# Patient Record
Sex: MALE | Race: WHITE | NOT HISPANIC OR LATINO | Employment: OTHER | URBAN - METROPOLITAN AREA
[De-identification: names, ages, dates, MRNs, and addresses within clinical notes are randomized per-mention and may not be internally consistent; named-entity substitution may affect disease eponyms.]

---

## 2021-05-28 ENCOUNTER — PRE-ADMISSION TESTING (OUTPATIENT)
Dept: PREADMISSION TESTING | Facility: HOSPITAL | Age: 86
End: 2021-05-28

## 2021-05-28 ENCOUNTER — TRANSCRIBE ORDERS (OUTPATIENT)
Dept: PREADMISSION TESTING | Facility: HOSPITAL | Age: 86
End: 2021-05-28

## 2021-05-28 VITALS
HEART RATE: 99 BPM | RESPIRATION RATE: 16 BRPM | HEIGHT: 76 IN | TEMPERATURE: 97.4 F | DIASTOLIC BLOOD PRESSURE: 56 MMHG | SYSTOLIC BLOOD PRESSURE: 117 MMHG | WEIGHT: 217 LBS | BODY MASS INDEX: 26.42 KG/M2 | OXYGEN SATURATION: 100 %

## 2021-05-28 DIAGNOSIS — Z01.818 OTHER SPECIFIED PRE-OPERATIVE EXAMINATION: Primary | ICD-10-CM

## 2021-05-28 LAB
ANION GAP SERPL CALCULATED.3IONS-SCNC: 14.2 MMOL/L (ref 5–15)
BUN SERPL-MCNC: 78 MG/DL (ref 8–23)
BUN/CREAT SERPL: 11.5 (ref 7–25)
CALCIUM SPEC-SCNC: 8.1 MG/DL (ref 8.6–10.5)
CHLORIDE SERPL-SCNC: 98 MMOL/L (ref 98–107)
CO2 SERPL-SCNC: 24.8 MMOL/L (ref 22–29)
CREAT SERPL-MCNC: 6.79 MG/DL (ref 0.76–1.27)
DEPRECATED RDW RBC AUTO: 58.5 FL (ref 37–54)
ERYTHROCYTE [DISTWIDTH] IN BLOOD BY AUTOMATED COUNT: 15.4 % (ref 12.3–15.4)
GFR SERPL CREATININE-BSD FRML MDRD: 8 ML/MIN/1.73
GFR SERPL CREATININE-BSD FRML MDRD: ABNORMAL ML/MIN/{1.73_M2}
GLUCOSE SERPL-MCNC: 290 MG/DL (ref 65–99)
HCT VFR BLD AUTO: 20.8 % (ref 37.5–51)
HGB BLD-MCNC: 6.5 G/DL (ref 13–17.7)
MCH RBC QN AUTO: 33 PG (ref 26.6–33)
MCHC RBC AUTO-ENTMCNC: 31.3 G/DL (ref 31.5–35.7)
MCV RBC AUTO: 105.6 FL (ref 79–97)
PLATELET # BLD AUTO: 146 10*3/MM3 (ref 140–450)
PMV BLD AUTO: 10.4 FL (ref 6–12)
POTASSIUM SERPL-SCNC: 4.7 MMOL/L (ref 3.5–5.2)
QT INTERVAL: 412 MS
RBC # BLD AUTO: 1.97 10*6/MM3 (ref 4.14–5.8)
SODIUM SERPL-SCNC: 137 MMOL/L (ref 136–145)
WBC # BLD AUTO: 5.75 10*3/MM3 (ref 3.4–10.8)

## 2021-05-28 PROCEDURE — 80048 BASIC METABOLIC PNL TOTAL CA: CPT

## 2021-05-28 PROCEDURE — 36415 COLL VENOUS BLD VENIPUNCTURE: CPT

## 2021-05-28 PROCEDURE — 93010 ELECTROCARDIOGRAM REPORT: CPT | Performed by: INTERNAL MEDICINE

## 2021-05-28 PROCEDURE — 85027 COMPLETE CBC AUTOMATED: CPT

## 2021-05-28 PROCEDURE — 93005 ELECTROCARDIOGRAM TRACING: CPT

## 2021-05-28 RX ORDER — AMLODIPINE BESYLATE 2.5 MG/1
2.5 TABLET ORAL EVERY EVENING
COMMUNITY
End: 2021-07-30

## 2021-05-28 NOTE — DISCHARGE INSTRUCTIONS
Take the following medications the morning of surgery with a small sip of water:    LEVOTHYROXINE AND SYMBICORT    ARRIVE AT 10:00    If you are on prescription narcotic pain medication to control your pain you may also take that medication the morning of surgery.    General Instructions:  • Do not eat or drink anything after midnight the night before surgery.  • Infants may have breast milk up to four hours before surgery.  • Infants drinking formula may drink formula up to six hours before surgery.   • Patients who avoid smoking, chewing tobacco and alcohol for 4 weeks prior to surgery have a reduced risk of post-operative complications.  Quit smoking as many days before surgery as you can.  • Do not smoke, use chewing tobacco or drink alcohol the day of surgery.   • If applicable bring your C-PAP/ BI-PAP machine.  • Bring any papers given to you in the doctor’s office.  • Wear clean comfortable clothes.  • Do not wear contact lenses, false eyelashes or make-up.  Bring a case for your glasses.   • Bring crutches or walker if applicable.  • Remove all piercings.  Leave jewelry and any other valuables at home.  • Hair extensions with metal clips must be removed prior to surgery.  • The Pre-Admission Testing nurse will instruct you to bring medications if unable to obtain an accurate list in Pre-Admission Testing.        If you were given a blood bank ID arm band remember to bring it with you the day of surgery.    Preventing a Surgical Site Infection:  • For 2 to 3 days before surgery, avoid shaving with a razor because the razor can irritate skin and make it easier to develop an infection.    • Any areas of open skin can increase the risk of a post-operative wound infection by allowing bacteria to enter and travel throughout the body.  Notify your surgeon if you have any skin wounds / rashes even if it is not near the expected surgical site.  The area will need assessed to determine if surgery should be delayed  until it is healed.  • The night prior to surgery shower using a fresh bar of anti-bacterial soap (such as Dial) and clean washcloth.  Sleep in a clean bed with clean clothing.  Do not allow pets to sleep with you.  • Shower on the morning of surgery using a fresh bar of anti-bacterial soap (such as Dial) and clean washcloth.  Dry with a clean towel and dress in clean clothing.  • Ask your surgeon if you will be receiving antibiotics prior to surgery.  • Make sure you, your family, and all healthcare providers clean their hands with soap and water or an alcohol based hand  before caring for you or your wound.    Day of surgery:  Your arrival time is approximately two hours before your scheduled surgery time.  Upon arrival, a Pre-op nurse and Anesthesiologist will review your health history, obtain vital signs, and answer questions you may have.  The only belongings needed at this time will be your home medications and if applicable your C-PAP/BI-PAP machine.  A Pre-op nurse will start an IV and you may receive medication in preparation for surgery, including something to help you relax.      Please be aware that surgery does come with discomfort.  We want to make every effort to control your discomfort so please discuss any uncontrolled symptoms with your nurse.   Your doctor will most likely have prescribed pain medications.      If you are going home after surgery you will receive individualized written care instructions before being discharged.  A responsible adult must drive you to and from the hospital on the day of your surgery and stay with you for 24 hours.  Discharge prescriptions can be filled by the hospital pharmacy during regular pharmacy hours.  If you are having surgery late in the day/evening your prescription may be e-prescribed to your pharmacy.  Please verify your pharmacy hours or chose a 24 hour pharmacy to avoid not having access to your prescription because your pharmacy has closed  for the day.    If you are staying overnight following surgery, you will be transported to your hospital room following the recovery period.  [facility] has all private rooms.    If you have any questions please call Pre-Admission Testing at [phone].  Deductibles and co-payments are collected on the day of service. Please be prepared to pay the required co-pay, deductible or deposit on the day of service as defined by your plan.    Patient Education for Self-Quarantine Process    Following your COVID testing, we strongly recommend that you do not leave your home after you have been tested for COVID except to get medical care. This includes not going to work, school or to public areas.  If this is not possible for you to do please limit your activities to only required outings.  Be sure to wear a mask when you are with other people, practice social distancing and wash your hands frequently.      The following items provide additional details to keep you safe.  • Wash your hands with soap and water frequently for at least 20 seconds.   • Avoid touching your eyes, nose and mouth with unwashed hands.  • Do not share anything - utensils, towels, food from the same bowl.   • Have your own utensils, drinking glass, dishes, towels and bedding.   • Do not have visitors.   • Do use FaceTime to stay in touch with family and friends.  • You should stay in a specific room away from others if possible.   • Stay at least 6 feet away from others in the home if you cannot have a dedicated room to yourself.   • Do not snuggle with your pet. While the CDC says there is no evidence that pets can spread COVID-19 or be infected from humans, it is probably best to avoid “petting, snuggling, being kissed or licked and sharing food (during self-quarantine)”, according to the CDC.   • Sanitize household surfaces daily. Include all high touch areas (door handles, light switches, phones, countertops, etc.)  • Do not share  a bathroom with others, if possible.   • Wear a mask around others in your home if you are unable to stay in a separate room or 6 feet apart. If  you are unable to wear a mask, have your family member wear a mask if they must be within 6 feet of you.   Call your surgeon immediately if you experience any of the following symptoms:  • Sore Throat  • Shortness of Breath or difficulty breathing  • Cough  • Chills  • Body soreness or muscle pain  • Headache  • Fever  • New loss of taste or smell  • Do not arrive for your surgery ill.  Your procedure will need to be rescheduled to another time.  You will need to call your physician before the day of surgery to avoid any unnecessary exposure to hospital staff as well as other patients.

## 2021-05-29 ENCOUNTER — LAB (OUTPATIENT)
Dept: LAB | Facility: HOSPITAL | Age: 86
End: 2021-05-29

## 2021-05-29 DIAGNOSIS — Z01.818 OTHER SPECIFIED PRE-OPERATIVE EXAMINATION: ICD-10-CM

## 2021-05-29 LAB — SARS-COV-2 ORF1AB RESP QL NAA+PROBE: NOT DETECTED

## 2021-05-29 PROCEDURE — C9803 HOPD COVID-19 SPEC COLLECT: HCPCS

## 2021-05-29 PROCEDURE — U0004 COV-19 TEST NON-CDC HGH THRU: HCPCS

## 2021-05-29 PROCEDURE — U0005 INFEC AGEN DETEC AMPLI PROBE: HCPCS

## 2021-06-01 ENCOUNTER — APPOINTMENT (OUTPATIENT)
Dept: CARDIOLOGY | Facility: HOSPITAL | Age: 86
End: 2021-06-01

## 2021-06-01 ENCOUNTER — HOSPITAL ENCOUNTER (OUTPATIENT)
Facility: HOSPITAL | Age: 86
Setting detail: HOSPITAL OUTPATIENT SURGERY
Discharge: HOME | End: 2021-06-01
Attending: UROLOGY | Admitting: UROLOGY

## 2021-06-01 ENCOUNTER — ANESTHESIA (OUTPATIENT)
Dept: PERIOP | Facility: HOSPITAL | Age: 86
End: 2021-06-01

## 2021-06-01 ENCOUNTER — ANESTHESIA EVENT (OUTPATIENT)
Dept: PERIOP | Facility: HOSPITAL | Age: 86
End: 2021-06-01

## 2021-06-01 VITALS
TEMPERATURE: 97.4 F | DIASTOLIC BLOOD PRESSURE: 82 MMHG | HEART RATE: 86 BPM | SYSTOLIC BLOOD PRESSURE: 136 MMHG | OXYGEN SATURATION: 98 % | BODY MASS INDEX: 26.91 KG/M2 | WEIGHT: 216.4 LBS | HEIGHT: 75 IN | RESPIRATION RATE: 20 BRPM

## 2021-06-01 DIAGNOSIS — R31.0 GROSS HEMATURIA: Primary | ICD-10-CM

## 2021-06-01 LAB
ABO GROUP BLD: NORMAL
ANION GAP SERPL CALCULATED.3IONS-SCNC: 14.2 MMOL/L (ref 5–15)
BLD GP AB SCN SERPL QL: NEGATIVE
BUN SERPL-MCNC: 89 MG/DL (ref 8–23)
BUN/CREAT SERPL: 13.9 (ref 7–25)
CALCIUM SPEC-SCNC: 8.8 MG/DL (ref 8.6–10.5)
CHLORIDE SERPL-SCNC: 100 MMOL/L (ref 98–107)
CO2 SERPL-SCNC: 28.8 MMOL/L (ref 22–29)
CREAT SERPL-MCNC: 6.41 MG/DL (ref 0.76–1.27)
GFR SERPL CREATININE-BSD FRML MDRD: 8 ML/MIN/1.73
GFR SERPL CREATININE-BSD FRML MDRD: ABNORMAL ML/MIN/{1.73_M2}
GLUCOSE BLDC GLUCOMTR-MCNC: 209 MG/DL (ref 70–130)
GLUCOSE BLDC GLUCOMTR-MCNC: 222 MG/DL (ref 70–130)
GLUCOSE SERPL-MCNC: 212 MG/DL (ref 65–99)
HCT VFR BLD AUTO: 25.9 % (ref 37.5–51)
HGB BLD-MCNC: 8.2 G/DL (ref 13–17.7)
POTASSIUM SERPL-SCNC: 4.5 MMOL/L (ref 3.5–5.2)
QT INTERVAL: 382 MS
RH BLD: NEGATIVE
SODIUM SERPL-SCNC: 143 MMOL/L (ref 136–145)
T&S EXPIRATION DATE: NORMAL

## 2021-06-01 PROCEDURE — 99204 OFFICE O/P NEW MOD 45 MIN: CPT | Performed by: INTERNAL MEDICINE

## 2021-06-01 PROCEDURE — 93225 XTRNL ECG REC<48 HRS REC: CPT

## 2021-06-01 PROCEDURE — 80048 BASIC METABOLIC PNL TOTAL CA: CPT | Performed by: UROLOGY

## 2021-06-01 PROCEDURE — 25010000002 ATROPINE PER 0.01 MG: Performed by: NURSE ANESTHETIST, CERTIFIED REGISTERED

## 2021-06-01 PROCEDURE — 63710000001 INSULIN REGULAR HUMAN PER 5 UNITS: Performed by: ANESTHESIOLOGY

## 2021-06-01 PROCEDURE — 25010000002 CEFAZOLIN 1-4 GM/50ML-% SOLUTION: Performed by: UROLOGY

## 2021-06-01 PROCEDURE — 85018 HEMOGLOBIN: CPT | Performed by: UROLOGY

## 2021-06-01 PROCEDURE — 86900 BLOOD TYPING SEROLOGIC ABO: CPT | Performed by: UROLOGY

## 2021-06-01 PROCEDURE — 93226 XTRNL ECG REC<48 HR SCAN A/R: CPT

## 2021-06-01 PROCEDURE — 25010000002 FENTANYL CITRATE (PF) 50 MCG/ML SOLUTION: Performed by: NURSE ANESTHETIST, CERTIFIED REGISTERED

## 2021-06-01 PROCEDURE — 85014 HEMATOCRIT: CPT | Performed by: UROLOGY

## 2021-06-01 PROCEDURE — 82962 GLUCOSE BLOOD TEST: CPT

## 2021-06-01 PROCEDURE — 86850 RBC ANTIBODY SCREEN: CPT | Performed by: UROLOGY

## 2021-06-01 PROCEDURE — 86901 BLOOD TYPING SEROLOGIC RH(D): CPT | Performed by: UROLOGY

## 2021-06-01 PROCEDURE — 25010000002 HYDROMORPHONE PER 4 MG: Performed by: NURSE ANESTHETIST, CERTIFIED REGISTERED

## 2021-06-01 PROCEDURE — 93005 ELECTROCARDIOGRAM TRACING: CPT | Performed by: INTERNAL MEDICINE

## 2021-06-01 PROCEDURE — 25010000002 PROPOFOL 10 MG/ML EMULSION: Performed by: NURSE ANESTHETIST, CERTIFIED REGISTERED

## 2021-06-01 PROCEDURE — 93010 ELECTROCARDIOGRAM REPORT: CPT | Performed by: INTERNAL MEDICINE

## 2021-06-01 RX ORDER — HYDROCODONE BITARTRATE AND ACETAMINOPHEN 7.5; 325 MG/1; MG/1
1 TABLET ORAL ONCE AS NEEDED
Status: DISCONTINUED | OUTPATIENT
Start: 2021-06-01 | End: 2021-06-01 | Stop reason: HOSPADM

## 2021-06-01 RX ORDER — DIPHENOXYLATE HYDROCHLORIDE AND ATROPINE SULFATE 2.5; .025 MG/1; MG/1
1 TABLET ORAL DAILY
COMMUNITY

## 2021-06-01 RX ORDER — LORATADINE 10 MG/1
10 TABLET ORAL DAILY
COMMUNITY
Start: 2021-01-19

## 2021-06-01 RX ORDER — ZOLPIDEM TARTRATE 5 MG/1
5 TABLET ORAL
COMMUNITY
End: 2021-08-02

## 2021-06-01 RX ORDER — LIDOCAINE HYDROCHLORIDE 10 MG/ML
0.5 INJECTION, SOLUTION EPIDURAL; INFILTRATION; INTRACAUDAL; PERINEURAL ONCE AS NEEDED
Status: DISCONTINUED | OUTPATIENT
Start: 2021-06-01 | End: 2021-06-01 | Stop reason: HOSPADM

## 2021-06-01 RX ORDER — LIDOCAINE HYDROCHLORIDE 20 MG/ML
INJECTION, SOLUTION INFILTRATION; PERINEURAL AS NEEDED
Status: DISCONTINUED | OUTPATIENT
Start: 2021-06-01 | End: 2021-06-01 | Stop reason: SURG

## 2021-06-01 RX ORDER — PROMETHAZINE HYDROCHLORIDE 25 MG/1
25 SUPPOSITORY RECTAL ONCE AS NEEDED
Status: DISCONTINUED | OUTPATIENT
Start: 2021-06-01 | End: 2021-06-01 | Stop reason: HOSPADM

## 2021-06-01 RX ORDER — OXYCODONE AND ACETAMINOPHEN 10; 325 MG/1; MG/1
1 TABLET ORAL EVERY 4 HOURS PRN
Status: DISCONTINUED | OUTPATIENT
Start: 2021-06-01 | End: 2021-06-01 | Stop reason: HOSPADM

## 2021-06-01 RX ORDER — SODIUM CHLORIDE, SODIUM LACTATE, POTASSIUM CHLORIDE, CALCIUM CHLORIDE 600; 310; 30; 20 MG/100ML; MG/100ML; MG/100ML; MG/100ML
INJECTION, SOLUTION INTRAVENOUS CONTINUOUS PRN
Status: DISCONTINUED | OUTPATIENT
Start: 2021-06-01 | End: 2021-06-01 | Stop reason: SURG

## 2021-06-01 RX ORDER — ERGOCALCIFEROL 1.25 MG/1
50000 CAPSULE ORAL WEEKLY
COMMUNITY

## 2021-06-01 RX ORDER — ATROPINE SULFATE 0.4 MG/ML
AMPUL (ML) INJECTION AS NEEDED
Status: DISCONTINUED | OUTPATIENT
Start: 2021-06-01 | End: 2021-06-01 | Stop reason: SURG

## 2021-06-01 RX ORDER — BLOOD SUGAR DIAGNOSTIC
STRIP MISCELLANEOUS
COMMUNITY
Start: 2021-04-16 | End: 2022-04-16

## 2021-06-01 RX ORDER — HYDROMORPHONE HYDROCHLORIDE 1 MG/ML
0.25 INJECTION, SOLUTION INTRAMUSCULAR; INTRAVENOUS; SUBCUTANEOUS
Status: DISCONTINUED | OUTPATIENT
Start: 2021-06-01 | End: 2021-06-01 | Stop reason: HOSPADM

## 2021-06-01 RX ORDER — SULFAMETHOXAZOLE AND TRIMETHOPRIM 800; 160 MG/1; MG/1
1 TABLET ORAL 2 TIMES DAILY
Qty: 6 TABLET | Refills: 0 | Status: SHIPPED | OUTPATIENT
Start: 2021-06-01 | End: 2021-06-29

## 2021-06-01 RX ORDER — PROMETHAZINE HYDROCHLORIDE 25 MG/1
25 TABLET ORAL ONCE AS NEEDED
Status: DISCONTINUED | OUTPATIENT
Start: 2021-06-01 | End: 2021-06-01 | Stop reason: HOSPADM

## 2021-06-01 RX ORDER — FENTANYL CITRATE 50 UG/ML
INJECTION, SOLUTION INTRAMUSCULAR; INTRAVENOUS AS NEEDED
Status: DISCONTINUED | OUTPATIENT
Start: 2021-06-01 | End: 2021-06-01 | Stop reason: SURG

## 2021-06-01 RX ORDER — ATORVASTATIN CALCIUM 40 MG/1
40 TABLET, FILM COATED ORAL NIGHTLY
COMMUNITY
Start: 2021-03-15

## 2021-06-01 RX ORDER — FENTANYL CITRATE 50 UG/ML
50 INJECTION, SOLUTION INTRAMUSCULAR; INTRAVENOUS
Status: DISCONTINUED | OUTPATIENT
Start: 2021-06-01 | End: 2021-06-01 | Stop reason: HOSPADM

## 2021-06-01 RX ORDER — PROPOFOL 10 MG/ML
VIAL (ML) INTRAVENOUS AS NEEDED
Status: DISCONTINUED | OUTPATIENT
Start: 2021-06-01 | End: 2021-06-01 | Stop reason: SURG

## 2021-06-01 RX ORDER — LABETALOL HYDROCHLORIDE 5 MG/ML
5 INJECTION, SOLUTION INTRAVENOUS
Status: DISCONTINUED | OUTPATIENT
Start: 2021-06-01 | End: 2021-06-01 | Stop reason: HOSPADM

## 2021-06-01 RX ORDER — SODIUM CHLORIDE 9 MG/ML
9 INJECTION, SOLUTION INTRAVENOUS CONTINUOUS
Status: DISCONTINUED | OUTPATIENT
Start: 2021-06-01 | End: 2021-06-01 | Stop reason: HOSPADM

## 2021-06-01 RX ORDER — MAGNESIUM HYDROXIDE 1200 MG/15ML
LIQUID ORAL AS NEEDED
Status: DISCONTINUED | OUTPATIENT
Start: 2021-06-01 | End: 2021-06-01 | Stop reason: HOSPADM

## 2021-06-01 RX ORDER — DIPHENHYDRAMINE HCL 25 MG
25 CAPSULE ORAL
Status: DISCONTINUED | OUTPATIENT
Start: 2021-06-01 | End: 2021-06-01 | Stop reason: HOSPADM

## 2021-06-01 RX ORDER — CEFAZOLIN SODIUM 1 G/50ML
1 INJECTION, SOLUTION INTRAVENOUS ONCE
Status: COMPLETED | OUTPATIENT
Start: 2021-06-01 | End: 2021-06-01

## 2021-06-01 RX ORDER — TRAZODONE HYDROCHLORIDE 50 MG/1
50-100 TABLET ORAL NIGHTLY
COMMUNITY
Start: 2021-05-12

## 2021-06-01 RX ORDER — EPHEDRINE SULFATE 50 MG/ML
5 INJECTION, SOLUTION INTRAVENOUS ONCE AS NEEDED
Status: DISCONTINUED | OUTPATIENT
Start: 2021-06-01 | End: 2021-06-01 | Stop reason: HOSPADM

## 2021-06-01 RX ORDER — ONDANSETRON 2 MG/ML
4 INJECTION INTRAMUSCULAR; INTRAVENOUS ONCE AS NEEDED
Status: DISCONTINUED | OUTPATIENT
Start: 2021-06-01 | End: 2021-06-01 | Stop reason: HOSPADM

## 2021-06-01 RX ORDER — NALOXONE HCL 0.4 MG/ML
0.2 VIAL (ML) INJECTION AS NEEDED
Status: DISCONTINUED | OUTPATIENT
Start: 2021-06-01 | End: 2021-06-01 | Stop reason: HOSPADM

## 2021-06-01 RX ORDER — ACETAMINOPHEN 325 MG/1
650 TABLET ORAL ONCE AS NEEDED
Status: DISCONTINUED | OUTPATIENT
Start: 2021-06-01 | End: 2021-06-01 | Stop reason: HOSPADM

## 2021-06-01 RX ORDER — LANCETS
EACH MISCELLANEOUS
COMMUNITY
Start: 2021-04-16 | End: 2022-04-16

## 2021-06-01 RX ORDER — LANOLIN ALCOHOL/MO/W.PET/CERES
1 CREAM (GRAM) TOPICAL DAILY
COMMUNITY
End: 2021-08-02

## 2021-06-01 RX ORDER — BLOOD-GLUCOSE METER
EACH MISCELLANEOUS
COMMUNITY
Start: 2021-04-16

## 2021-06-01 RX ORDER — FAMOTIDINE 10 MG/ML
20 INJECTION, SOLUTION INTRAVENOUS ONCE
Status: COMPLETED | OUTPATIENT
Start: 2021-06-01 | End: 2021-06-01

## 2021-06-01 RX ORDER — LIDOCAINE HYDROCHLORIDE 20 MG/ML
JELLY TOPICAL ONCE AS NEEDED
Status: COMPLETED | OUTPATIENT
Start: 2021-06-01 | End: 2021-06-01

## 2021-06-01 RX ORDER — DIPHENHYDRAMINE HYDROCHLORIDE 50 MG/ML
12.5 INJECTION INTRAMUSCULAR; INTRAVENOUS
Status: DISCONTINUED | OUTPATIENT
Start: 2021-06-01 | End: 2021-06-01 | Stop reason: HOSPADM

## 2021-06-01 RX ORDER — FLUMAZENIL 0.1 MG/ML
0.2 INJECTION INTRAVENOUS AS NEEDED
Status: DISCONTINUED | OUTPATIENT
Start: 2021-06-01 | End: 2021-06-01 | Stop reason: HOSPADM

## 2021-06-01 RX ORDER — SODIUM CHLORIDE 0.9 % (FLUSH) 0.9 %
3-10 SYRINGE (ML) INJECTION AS NEEDED
Status: DISCONTINUED | OUTPATIENT
Start: 2021-06-01 | End: 2021-06-01 | Stop reason: HOSPADM

## 2021-06-01 RX ORDER — SODIUM CHLORIDE 0.9 % (FLUSH) 0.9 %
3 SYRINGE (ML) INJECTION EVERY 12 HOURS SCHEDULED
Status: DISCONTINUED | OUTPATIENT
Start: 2021-06-01 | End: 2021-06-01 | Stop reason: HOSPADM

## 2021-06-01 RX ORDER — GLYCOPYRROLATE 0.2 MG/ML
INJECTION INTRAMUSCULAR; INTRAVENOUS AS NEEDED
Status: DISCONTINUED | OUTPATIENT
Start: 2021-06-01 | End: 2021-06-01 | Stop reason: SURG

## 2021-06-01 RX ORDER — SODIUM CHLORIDE, SODIUM LACTATE, POTASSIUM CHLORIDE, CALCIUM CHLORIDE 600; 310; 30; 20 MG/100ML; MG/100ML; MG/100ML; MG/100ML
9 INJECTION, SOLUTION INTRAVENOUS CONTINUOUS
Status: DISCONTINUED | OUTPATIENT
Start: 2021-06-01 | End: 2021-06-01

## 2021-06-01 RX ORDER — MIDAZOLAM HYDROCHLORIDE 1 MG/ML
0.5 INJECTION INTRAMUSCULAR; INTRAVENOUS
Status: DISCONTINUED | OUTPATIENT
Start: 2021-06-01 | End: 2021-06-01 | Stop reason: HOSPADM

## 2021-06-01 RX ORDER — FENTANYL CITRATE 50 UG/ML
25 INJECTION, SOLUTION INTRAMUSCULAR; INTRAVENOUS
Status: DISCONTINUED | OUTPATIENT
Start: 2021-06-01 | End: 2021-06-01 | Stop reason: HOSPADM

## 2021-06-01 RX ADMIN — SODIUM CHLORIDE, POTASSIUM CHLORIDE, SODIUM LACTATE AND CALCIUM CHLORIDE: 600; 310; 30; 20 INJECTION, SOLUTION INTRAVENOUS at 12:19

## 2021-06-01 RX ADMIN — GLYCOPYRROLATE 0.2 MG: 0.2 INJECTION INTRAMUSCULAR; INTRAVENOUS at 12:30

## 2021-06-01 RX ADMIN — PROPOFOL 100 MG: 10 INJECTION, EMULSION INTRAVENOUS at 12:24

## 2021-06-01 RX ADMIN — FENTANYL CITRATE 25 MCG: 50 INJECTION, SOLUTION INTRAMUSCULAR; INTRAVENOUS at 13:00

## 2021-06-01 RX ADMIN — LIDOCAINE HYDROCHLORIDE 60 MG: 20 INJECTION, SOLUTION INFILTRATION; PERINEURAL at 12:24

## 2021-06-01 RX ADMIN — HYDROMORPHONE HYDROCHLORIDE 0.25 MG: 1 INJECTION, SOLUTION INTRAMUSCULAR; INTRAVENOUS; SUBCUTANEOUS at 13:25

## 2021-06-01 RX ADMIN — INSULIN HUMAN 5 UNITS: 100 INJECTION, SOLUTION PARENTERAL at 12:00

## 2021-06-01 RX ADMIN — CEFAZOLIN SODIUM 1 G: 1 INJECTION, SOLUTION INTRAVENOUS at 12:16

## 2021-06-01 RX ADMIN — FENTANYL CITRATE 25 MCG: 50 INJECTION INTRAMUSCULAR; INTRAVENOUS at 12:33

## 2021-06-01 RX ADMIN — HYDROMORPHONE HYDROCHLORIDE 0.25 MG: 1 INJECTION, SOLUTION INTRAMUSCULAR; INTRAVENOUS; SUBCUTANEOUS at 13:35

## 2021-06-01 RX ADMIN — FAMOTIDINE 20 MG: 10 INJECTION INTRAVENOUS at 11:56

## 2021-06-01 RX ADMIN — LIDOCAINE HYDROCHLORIDE: 20 JELLY TOPICAL at 13:48

## 2021-06-01 RX ADMIN — FENTANYL CITRATE 25 MCG: 50 INJECTION INTRAMUSCULAR; INTRAVENOUS at 12:29

## 2021-06-01 RX ADMIN — ATROPINE SULFATE 0.4 MG: 0.4 INJECTION, SOLUTION INTRAMUSCULAR; INTRAVENOUS; SUBCUTANEOUS at 12:35

## 2021-06-01 NOTE — ANESTHESIA POSTPROCEDURE EVALUATION
Patient: Nikita Davis    Procedure Summary     Date: 06/01/21 Room / Location: [facility] OR 01 / [facility] MAIN OR    Anesthesia Start: 1219 Anesthesia Stop: 1250    Procedure: CYSTOSCOPY WITH FULGURATION (N/A Urethra) Diagnosis:     Surgeons: Anton Segal MD Provider: Maddie Gregory MD    Anesthesia Type: general ASA Status: 3          Anesthesia Type: general    Vitals  Vitals Value Taken Time   /80 06/01/21 1605   Temp 36.3 °C (97.4 °F) 06/01/21 1535   Pulse 106 06/01/21 1605   Resp 18 06/01/21 1550   SpO2 100 % 06/01/21 1605   Vitals shown include unvalidated device data.        Post Anesthesia Care and Evaluation    Patient location during evaluation: bedside  Patient participation: complete - patient participated  Level of consciousness: awake  Pain management: adequate  Airway patency: patent  Anesthetic complications: No anesthetic complications  PONV Status: controlled  Cardiovascular status: acceptable  Respiratory status: acceptable  Hydration status: acceptable    Comments: --------------------            06/01/21               1617     --------------------   BP:       148/81     Pulse:      97       Resp:       16       Temp:                SpO2:      98%      --------------------
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
## 2021-06-01 NOTE — OP NOTE
PREOPERATIVE DIAGNOSIS: Hematuria, metastatic prostate cancer.    POSTOPERATIVE DIAGNOSIS: Same    PROCEDURE: Cystoscopy with fulguration of hemorrhage from prostate.    SURGEON:  Anton Segal MD    ASSISTANT: None    ANESTHESIA: General    EBL: None    DRAINS: None    COMPLICATIONS: Patient had several episodes of asystole which he spontaneously reverted back to sinus rhythm.  The procedure was cut short.    FINDINGS: Hemorrhagic areas from prostate.    INDICATIONS FOR PROCEDURE: History of gross hematuria.  Patient is needed blood transfusion said history of a metastatic prostate cancer presents today for cystoscopy TUR prostate and fulguration of bleeding lesions.  Risk and benefits were explained include but not limited to bleeding infection multiple procedures.  Patient consented to the procedure.    DESCRIPTION OF PROCEDURE: After receiving antibiotics he was taken to the cystoscopy suite underwent a general anesthesia.  After adequate anesthesia was obtained he is placed in dorsal lithotomy position.  His groins prepped and draped sterile fashion.  A 21 Citizen of [place] cystoscope introduced into the urethra and the prostate the prostate was markedly enlarged with appears to be recurrence of prostate cancer that is hemorrhagic on the lateral walls and at the base.  He then developed a couple runs of asystole which he spontaneously reverted.  Because of that I cut the procedure short I did not do a full TUR.  I quickly did a fulguration of these lesions while he was back in sinus rhythm.  There is no active bleeding at that point.  Again were not able do the full procedure because of asystole.  But he did convert to normal sinus rhythm prior to the procedure being completed.  Cystoscope was removed he was in extubated taken recovery in satisfactory condition.  He tolerated the procedure well.

## 2021-06-01 NOTE — SIGNIFICANT NOTE
S/w Manolo Tinoco, daughter. Advised that pt has left for OR, and she will receive an telephone update from the surgeon post procedure. Also advised that pt is going top stay overnight. Verbalized understanding.

## 2021-06-01 NOTE — NURSING NOTE
Dr. Ruiz came to bedside to assess patient.  He is ok for patient to be discharged home with a 48 hour holter monitor.  His office will relay results, and if there are any needs for further intervention.   Another STAT EKG ordered and results relayed to G nurse, Torie, to notify Dr. Ruiz.    Dr. Nguyens aware and to put in orders in for d/c  Straight cathed x1 with 800cc clear red urine (patient self caths at home)

## 2021-06-01 NOTE — CONSULTS
Date of Hospital Visit: 2021  Date of consult: 2021  Encounter Provider: Wu Ruiz MD  Place of Service: [facility] CARDIOLOGY  Patient Name: Nikita Davis  :1931  Referral Provider: Anton Segal,*    Chief complaint: Hematuria    Reason for consultation: Asystole during surgery     History of Present Illness:    Mr. Davis is an 89 year old male patient who follows with [facility] with a history significant for chronic diastolic heart failure, CKD on dialysis, diabetes, anemia, hypothyroidism, permanent atrial fibrillation, nonocclusive CAD, low-grade mitral regurgitation, hypertension and pericardial effusion. His last echo in 2019 showed moderate mitral regurgitation with moderate to severe tricuspid regurgitation with an LVEF of 50% (taken from Dr. Thomas's note, unable to view file). Last cardiac cath in 2017 (also unable to view).     He has been having hematuria and diagnosed with metastatic prostate cancer. He was recently seen by Dr. Segal who recommended cystoscopy with possible TURP and fulguration of bleeding lesions. He presented to [facility] OR today for this procedure and unfortunately it was cut short when the patient developed bradycardia followed followed by 2 episodes of asystole that lasted for several seconds.  Patient lost pulse but no CPR done as he recovered immediately with heart rate in the 70s and 80s.      Unable to access previous cardiac testing.     Past Medical History:   Diagnosis Date   • Anemia    • Asthma    • Bruises easily    • CHF (congestive heart failure) (CMS/[facility])    • Diabetes mellitus (CMS/[facility])    • Disease of thyroid gland    • Hematuria    • History of transfusion 2021   • [place] ([place] hearing[place]    • Hyperlipidemia    • Hypertension    • Limited jaw range of motion     RIGHT ELBOW   • Peritoneal dialysis status (CMS/HCC)    • Renal disorder        Past Surgical History:   Procedure  Laterality Date   • ADENOIDECTOMY     • APPENDECTOMY     • COLONOSCOPY     • EYE SURGERY     • HIP BIPOLAR REPLACEMENT Left    • MASTOIDECTOMY     • REPLACEMENT TOTAL KNEE Bilateral    • TONSILLECTOMY     • TOTAL KNEE ARTHROPLASTY REVISION Bilateral        Medications Prior to Admission   Medication Sig Dispense Refill Last Dose   • albuterol sulfate  (90 Base) MCG/ACT inhaler Inhale 2 puffs Daily As Needed.   Past Month at Unknown time   • amLODIPine (NORVASC) 2.5 MG tablet Take 2.5 mg by mouth Every Evening.   5/31/2021 at 1800   • atorvastatin (LIPITOR) 40 MG tablet Take 40 mg by mouth Daily.   5/31/2021 at 1800   • Blood Glucose Monitoring Suppl (Accu-Chek Erika Plus) w/Device kit Test daily before all meals/snacks and once before bedtime.   6/1/2021 at Unknown time   • Budesonide-Formoterol Fumarate (SYMBICORT IN) Inhale 2 puffs Every Morning.   6/1/2021 at Unknown time   • Calcium Carbonate (CALCIUM 600 PO) Take 1 tablet by mouth Every Morning.   5/31/2021 at 1800   • carvedilol (COREG) 6.25 MG tablet Take 3.125 mg by mouth Every Evening.   5/31/2021 at 1800   • furosemide (LASIX) 80 MG tablet Take 80 mg by mouth 3 (Three) Times a Day.  3 5/31/2021 at 1800   • glucose blood (Accu-Chek Erika Plus) test strip Test daily before all meals/snacks and once before bedtime.   6/1/2021 at Unknown time   • INSULIN GLARGINE SC Inject 14 Units under the skin into the appropriate area as directed Daily.   5/31/2021 at 0800   • Insulin Lispro (HUMALOG KWIKPEN SC) Inject 6 Units under the skin into the appropriate area as directed 3 (Three) Times a Day.   5/31/2021 at 1800   • Lancets (accu-chek multiclix) lancets Test daily before all meals/snacks and once before bedtime.   6/1/2021 at Unknown time   • LANTUS SOLOSTAR 100 UNIT/ML injection pen Inject  under the skin into the appropriate area as directed 3 (Three) Times a Day Before Meals. SLIDING SCALE   5/31/2021 at 0800   • levothyroxine (SYNTHROID, LEVOTHROID) 175  MCG tablet Take 175 mcg by mouth Daily.   6/1/2021 at 0800   • melatonin 3 MG tablet Take 1 tablet by mouth Daily.   Past Month at Unknown time   • zolpidem (AMBIEN) 5 MG tablet Take 5 mg by mouth.   Past Month at Unknown time   • ergocalciferol (ERGOCALCIFEROL) 1.25 MG (01064 UT) capsule Take 50,000 Units by mouth.   Unknown at Unknown time   • loratadine (CLARITIN) 10 MG tablet Take 10 mg by mouth Daily.   Unknown at Unknown time   • multivitamin (Multi-Vitamin Daily) tablet tablet Take 1 tablet by mouth Daily.   Unknown at Unknown time   • traZODone (DESYREL) 50 MG tablet TAKE 1/2 TO 1 TABLET BY MOUTH EVERY NIGHT AT BEDTIME AS NEEDED FOR SLEEP   More than a month at Unknown time   • Vitamins/Minerals tablet Take 1 tablet by mouth Daily. PT HOLDING FOR SURGERY   Unknown at Unknown time       Current Meds  Scheduled Meds:   Continuous Infusions:sodium chloride, 9 mL/hr      PRN Meds:.•  acetaminophen **OR** acetaminophen  •  diphenhydrAMINE  •  diphenhydrAMINE  •  ePHEDrine  •  fentanyl  •  flumazenil  •  HYDROcodone-acetaminophen  •  HYDROmorphone  •  labetalol  •  naloxone  •  ondansetron  •  oxyCODONE-acetaminophen  •  promethazine **OR** promethazine    Allergies as of 05/27/2021   • (No Known Allergies)       Social History     Socioeconomic History   • Marital status:      Spouse name: Not on file   • Number of children: Not on file   • Years of education: Not on file   • Highest education level: Not on file   Tobacco Use   • Smoking status: Former Smoker     Packs/day: 4.00     Years: 10.00     Pack years: 40.00     Types: Cigarettes   • Smokeless tobacco: Never Used   • Tobacco comment: QUIT AGE 40   Vaping Use   • Vaping Use: Never used   Substance and Sexual Activity   • Alcohol use: No   • Drug use: Never   • Sexual activity: Defer       Family History   Problem Relation Age of Onset   • Malig Hyperthermia Neg Hx        REVIEW OF SYSTEMS:   All systems reviewed and negative except as noted in  "HPI.     Objective:   Temp:  [97.4 °F (36.3 °C)-97.8 °F (36.6 °C)] 97.4 °F (36.3 °C)  Heart Rate:  [] 106  Resp:  [18-20] 18  BP: (134-147)/(76-85) 134/77  Body mass index is 27.05 kg/m².  Flowsheet Rows      First Filed Value   Admission Height  190.5 cm (75\") Documented at 06/01/2021 1110   Admission Weight  98.2 kg (216 lb 6.4 oz) Documented at 06/01/2021 1110        Vitals:    06/01/21 1505   BP: 134/77   Pulse: 106   Resp: 18   Temp:    SpO2: 100%       General Appearance:    Alert, cooperative, in no acute distress, hard of hearing   Head:    Normocephalic, without obvious abnormality, atraumatic   Eyes:            Lids and lashes normal, conjunctivae and sclerae normal, no   icterus, no pallor, corneas clear, PERRLA   Ears:    Ears appear intact with no abnormalities noted   Throat:   No oral lesions, no thrush, oral mucosa moist   Neck:   No adenopathy, supple, trachea midline, no thyromegaly, no   carotid bruit, no JVD   Back:     No kyphosis present, no scoliosis present, no skin lesions, erythema or scars, no tenderness to percussion or palpation, range of motion normal   Lungs:     Clear to auscultation,respirations regular, even and unlabored    Heart:    Regular rhythm and normal rate, normal S1 and S2, no murmur, no gallop, no rub, no click   Chest Wall:    No abnormalities observed   Abdomen:     Normal bowel sounds, no masses, no organomegaly, soft nontender, nondistended, no guarding, no rebound  tenderness   Extremities:   Moves all extremities well, no edema, no cyanosis, no redness   Pulses:   Pulses palpable and equal bilaterally. Normal radial, carotid, femoral, dorsalis pedis and posterior tibial pulses bilaterally. Normal abdominal aorta   Skin:  Neurology:   Psychiatric:   No bleeding, bruising or rash   Normal speech and cranial nerve exam, no focal deficit   Alert and oriented x 3, normal mood and affect                 Review of Data:      Results from last 7 days   Lab Units " 06/01/21  1116   SODIUM mmol/L 143   POTASSIUM mmol/L 4.5   CHLORIDE mmol/L 100   CO2 mmol/L 28.8   BUN mg/dL 89*   CREATININE mg/dL 6.41*   CALCIUM mg/dL 8.8   GLUCOSE mg/dL 212*         @LABRCNTbnp@  Results from last 7 days   Lab Units 06/01/21  1123 05/28/21  1326   WBC 10*3/mm3  --  5.75   HEMOGLOBIN g/dL 8.2* 6.5*   HEMATOCRIT % 25.9* 20.8*   PLATELETS 10*3/mm3  --  146             @LABRCNTIP(chol,trig,hdl,ldl)  05/28/2021:      I personally viewed and interpreted the patient's EKG/Telemetry data  )There is no problem list on file for this patient.    Assessment and Plan:    1.  Sinus bradycardia and asystole that lasted for 7 to 10 seconds twice after demonstration of anesthesia and at the beginning of cystoscopy.  Patient heart rate improved immediately running in the 70s and 80s.  Procedure was aborted and he did not get TURP -likely vasovagal response  -At time of my exam in the PACU his heart rate was running within normal range and he was awake and alert.  Patient reports intermittent episodes of presyncopal spells that he gets occasionally but denied any syncope at home.  -Patient is hemodynamically stable at time of exam with normal heart rate, oxygenation and blood pressure.  He is on meds include beta-blocker.  -EKG done here is suggestive of junctional escape rhythm with some intermittent P waves concerning for significant sinus disease  -No definite A. fib noted    2.  Prior history of permanent A. fib per [place] cardiology documentation and he was on chronic anticoagulation that was discontinued because of anemia that needed transfusion and thought to be due to significant hematuria for which he was getting the procedure today  -Patient takes Coreg    3.  Hypertension-fair control      Patient will be released home on 48-hour Holter monitor and depending on finding he may need monitoring for extended period as he reports intermittent episodes of presyncope at home.      Wu Ruiz,  MD  06/01/21  15:41 EDT.  Time spent in reviewing chart, discussion and examination:

## 2021-06-01 NOTE — ANESTHESIA PROCEDURE NOTES
Airway  Urgency: elective    Date/Time: 6/1/2021 12:26 PM    General Information and Staff    Patient location during procedure: OR  Anesthesiologist: Maddie Gregory MD  CRNA: Aby Bee CRNA    Indications and Patient Condition  Indications for airway management: airway protection    Preoxygenated: yes  Mask difficulty assessment: 1 - vent by mask    Final Airway Details  Final airway type: supraglottic airway      Successful airway: unique  Size 5    Assessment: lips, teeth, and gum same as pre-op and atraumatic intubation

## 2021-06-01 NOTE — H&P
FIRST UROLOGY CONSULT      Patient Identification:  NAME:  Nikita Davis  Age:  89 y.o.   Sex:  male   :  1931   MRN:  [id number]       Chief complaint: Hematuria.      History of present illness:  H/o gross hematuria, metastatic CaP. For cysto TUR.        Past medical history:  Past Medical History:   Diagnosis Date   • Anemia    • Asthma    • Bruises easily    • CHF (congestive heart failure) (CMS/[facility])    • Diabetes mellitus (CMS/[facility])    • Disease of thyroid gland    • Hematuria    • History of transfusion 2021   • [place] ([place] hearing[place]    • Hyperlipidemia    • Hypertension    • Limited jaw range of motion     RIGHT ELBOW   • Peritoneal dialysis status (CMS/[facility])    • Renal disorder        Past surgical history:  Past Surgical History:   Procedure Laterality Date   • ADENOIDECTOMY     • APPENDECTOMY     • COLONOSCOPY     • EYE SURGERY     • HIP BIPOLAR REPLACEMENT Left    • MASTOIDECTOMY     • REPLACEMENT TOTAL KNEE Bilateral    • TONSILLECTOMY     • TOTAL KNEE ARTHROPLASTY REVISION Bilateral        Allergies:  Tamsulosin and Ferric citrate    Home medications:  Medications Prior to Admission   Medication Sig Dispense Refill Last Dose   • atorvastatin (LIPITOR) 40 MG tablet Take 40 mg by mouth Daily.      • Blood Glucose Monitoring Suppl (Accu-Chek Erika Plus) w/Device kit Test daily before all meals/snacks and once before bedtime.      • glucose blood (Accu-Chek Erika Plus) test strip Test daily before all meals/snacks and once before bedtime.      • Lancets (accu-chek multiclix) lancets Test daily before all meals/snacks and once before bedtime.      • loratadine (CLARITIN) 10 MG tablet Take 10 mg by mouth Daily.      • traZODone (DESYREL) 50 MG tablet TAKE 1/2 TO 1 TABLET BY MOUTH EVERY NIGHT AT BEDTIME AS NEEDED FOR SLEEP      • albuterol sulfate  (90 Base) MCG/ACT inhaler Inhale 2 puffs Daily As Needed.      • amLODIPine (NORVASC) 2.5 MG tablet Take 2.5 mg by mouth Every Evening.       • Budesonide-Formoterol Fumarate (SYMBICORT IN) Inhale 2 puffs Every Morning.      • Calcium Carbonate (CALCIUM 600 PO) Take 1 tablet by mouth Every Morning.      • carvedilol (COREG) 6.25 MG tablet Take 3.125 mg by mouth Every Evening.      • ergocalciferol (ERGOCALCIFEROL) 1.25 MG (74469 UT) capsule Take 50,000 Units by mouth.      • furosemide (LASIX) 80 MG tablet Take 80 mg by mouth 3 (Three) Times a Day.  3    • INSULIN GLARGINE SC Inject 20 Units under the skin into the appropriate area as directed Daily.      • Insulin Lispro (HUMALOG KWIKPEN SC) Inject 6 Units under the skin into the appropriate area as directed 3 (Three) Times a Day.      • LANTUS SOLOSTAR 100 UNIT/ML injection pen Inject  under the skin into the appropriate area as directed 3 (Three) Times a Day Before Meals. SLIDING SCALE      • levothyroxine (SYNTHROID, LEVOTHROID) 175 MCG tablet Take 175 mcg by mouth Daily.      • melatonin 3 MG tablet Take 1 tablet by mouth Daily.      • multivitamin (Multi-Vitamin Daily) tablet tablet Take 1 tablet by mouth Daily.      • simvastatin (ZOCOR) 20 MG tablet Take 20 mg by mouth Daily.      • Vitamins/Minerals tablet Take 1 tablet by mouth Daily. PT HOLDING FOR SURGERY      • zolpidem (AMBIEN) 5 MG tablet Take 5 mg by mouth.           Hospital medications:  ceFAZolin, 1 g, Intravenous, Once             Family history:  Family History   Problem Relation Age of Onset   • Malig Hyperthermia Neg Hx        Social history:  Social History     Tobacco Use   • Smoking status: Former Smoker     Packs/day: 4.00     Years: 10.00     Pack years: 40.00     Types: Cigarettes   • Smokeless tobacco: Never Used   • Tobacco comment: QUIT AGE 40   Vaping Use   • Vaping Use: Never used   Substance Use Topics   • Alcohol use: No   • Drug use: Never       Review of systems:      Positive for:  Hematuria.    Negative for:  Fever.      Objective:  TMax 24 hours:   No data recorded.      Vitals Ranges:        Intake/Output Last  3 shifts:  No intake/output data recorded.     Physical Exam:    General Appearance:    Alert, cooperative, NAD   HEENT:    No trauma, pupils reactive, hearing intact   Back:     No CVA tenderness   Lungs:     Respirations unlabored, no wheezing    Heart:    RRR.   Abdomen:     Soft, NDNT, no masses, no guarding   :       Extremities:   No edema, no deformity   Lymphatic:   No neck or groin LAD   Skin:   No bleeding, bruising or rashes   Neuro/Psych:   Orientation intact, mood/affect pleasant, no focal findings       Results review:   I reviewed the patient's new clinical results.    Data review:  Lab Results (last 24 hours)     Procedure Component Value Units Date/Time    POC Glucose Once [[id number]]  (Abnormal) Collected: 06/01/21 1057    Specimen: Blood Updated: 06/01/21 1058     Glucose 209 mg/dL            Imaging:  Imaging Results (Last 24 Hours)     ** No results found for the last 24 hours. **             Assessment:       * No active hospital problems. *    Hematuria.      Plan:     Cysto TURP.  R/B d/w pt.      Anton Segal MD  06/01/21  11:00 EDT

## 2021-06-03 ENCOUNTER — TELEPHONE (OUTPATIENT)
Dept: CARDIOLOGY | Facility: CLINIC | Age: 86
End: 2021-06-03

## 2021-06-03 DIAGNOSIS — G47.10 HYPERSOMNIA: ICD-10-CM

## 2021-06-03 DIAGNOSIS — R94.31 ABNORMAL EKG: Primary | ICD-10-CM

## 2021-06-03 DIAGNOSIS — I48.0 PAF (PAROXYSMAL ATRIAL FIBRILLATION) (HCC): ICD-10-CM

## 2021-06-03 DIAGNOSIS — R00.1 BRADYCARDIA: ICD-10-CM

## 2021-06-03 DIAGNOSIS — I45.5 SINUS PAUSE: Primary | ICD-10-CM

## 2021-06-03 LAB
MAXIMAL PREDICTED HEART RATE: 131 BPM
STRESS TARGET HR: 111 BPM

## 2021-06-03 PROCEDURE — 93227 XTRNL ECG REC<48 HR R&I: CPT | Performed by: INTERNAL MEDICINE

## 2021-06-03 NOTE — TELEPHONE ENCOUNTER
Patient's monitor study shows some bradycardia and multiple sinus pauses while sleeping.  A. fib was reported but review of strips showed this to be artifact, per MD interpretation of report.    Would recommend EP referral for further evaluation and also would recommend home sleep study.  Would like to find out how much carvedilol he is taking and likely stop this.    --------------------------      Called to discuss with patient but no answer.  Left a voicemail asking him to call office back on both listed phone numbers.        Dr. Ruiz--- I will continue to try to reach patient but please let me know if you have anything more to add.  Looks like anticoagulation is currently on hold due to significant anemia.  Was going to get him into EP and look at stopping his carvedilol if he is still taking this.

## 2021-06-03 NOTE — TELEPHONE ENCOUNTER
Called and spoke with patient.  He is wanting to transfer care to our office.  He is taking 6.25mg coreg once a day at bedtime.  He will stop this medication. Will monitor BP at home and call if staying above 130s/80 on average at which point may need to increase other medications.  He does have a history of significant morning fatigue and is amenable to home sleep study, order placed.  He will call if he has not received a call by tomorrow morning to get him scheduled with EP.  He will go the ER if he has any syncope, near syncope, lightheadedness, extreme fatigue, or other issues prior to that time.      Dr. Ruiz---  AGUSTÍN.  Patient really wanting to follow with our office if that is okay with you.

## 2021-06-29 ENCOUNTER — OFFICE VISIT (OUTPATIENT)
Dept: CARDIOLOGY | Facility: CLINIC | Age: 86
End: 2021-06-29

## 2021-06-29 ENCOUNTER — HOSPITAL ENCOUNTER (OUTPATIENT)
Dept: CARDIOLOGY | Facility: HOSPITAL | Age: 86
Discharge: HOME | End: 2021-06-29
Admitting: INTERNAL MEDICINE

## 2021-06-29 VITALS
SYSTOLIC BLOOD PRESSURE: 130 MMHG | BODY MASS INDEX: 27 KG/M2 | WEIGHT: 216 LBS | DIASTOLIC BLOOD PRESSURE: 82 MMHG | HEART RATE: 82 BPM

## 2021-06-29 VITALS
BODY MASS INDEX: 26.36 KG/M2 | SYSTOLIC BLOOD PRESSURE: 120 MMHG | DIASTOLIC BLOOD PRESSURE: 56 MMHG | HEIGHT: 75 IN | HEART RATE: 72 BPM | WEIGHT: 212 LBS

## 2021-06-29 DIAGNOSIS — R94.31 ABNORMAL EKG: ICD-10-CM

## 2021-06-29 DIAGNOSIS — C61 PROSTATE CANCER (HCC): ICD-10-CM

## 2021-06-29 DIAGNOSIS — Z99.2 PERITONEAL DIALYSIS STATUS (HCC): Primary | ICD-10-CM

## 2021-06-29 DIAGNOSIS — I48.19 ATRIAL FIBRILLATION, PERSISTENT (HCC): ICD-10-CM

## 2021-06-29 PROCEDURE — 93306 TTE W/DOPPLER COMPLETE: CPT | Performed by: INTERNAL MEDICINE

## 2021-06-29 PROCEDURE — 99214 OFFICE O/P EST MOD 30 MIN: CPT | Performed by: INTERNAL MEDICINE

## 2021-06-29 PROCEDURE — 93306 TTE W/DOPPLER COMPLETE: CPT

## 2021-06-29 NOTE — PROGRESS NOTES
Date of Office Visit: 2021  Encounter Provider: Alvin Shelton MD  Place of Service: [facility] CARDIOLOGY  Patient Name: Nikita Davis  :1931    Chief Complaint   Patient presents with   • Slow Heart Rate   • PAF   :     HPI: Nikita Davis is a 89 y.o. male who presents today for bradycardia.    He was recently found to have metastatic prostate cancer after experiencing hematuria.  He was admitted for cystoscopy and possible TURP.  In the OR he had several pauses and the procedure was aborted.  The longest was reported at 7 seconds.    He has a history of ESRD, and is on dialysis.    Patient presents today for follow-up.  His only complaint is ongoing hematuria.  He otherwise reports that he is in his usual health.  He has not had any syncope.    He previously followed with Dr. Thomas, and is noted in the records have been permanent atrial fibrillation.       Past Medical History:   Diagnosis Date   • Anemia    • Asthma    • Bruises easily    • CHF (congestive heart failure) (CMS/HCC)    • Diabetes mellitus (CMS/[facility])    • Disease of thyroid gland    • Hematuria    • History of transfusion 2021   • [place] ([place] hearing[place]    • Hyperlipidemia    • Hypertension    • Limited jaw range of motion     RIGHT ELBOW   • Peritoneal dialysis status (CMS/HCC)    • Renal disorder        Past Surgical History:   Procedure Laterality Date   • ADENOIDECTOMY     • APPENDECTOMY     • COLONOSCOPY     • CYSTOSCOPY N/A 2021    Procedure: CYSTOSCOPY WITH FULGURATION;  Surgeon: Anton Segal MD;  Location: [facility];  Service: Urology;  Laterality: N/A;   • EYE SURGERY     • HIP BIPOLAR REPLACEMENT Left    • MASTOIDECTOMY     • REPLACEMENT TOTAL KNEE Bilateral    • TONSILLECTOMY     • TOTAL KNEE ARTHROPLASTY REVISION Bilateral        Social History     Socioeconomic History   • Marital status:      Spouse name: Not on file   • Number of children: Not on file   •  Years of education: Not on file   • Highest education level: Not on file   Tobacco Use   • Smoking status: Former Smoker     Packs/day: 4.00     Years: 10.00     Pack years: 40.00     Types: Cigarettes   • Smokeless tobacco: Never Used   • Tobacco comment: QUIT AGE 40   Vaping Use   • Vaping Use: Never used   Substance and Sexual Activity   • Alcohol use: No   • Drug use: Never   • Sexual activity: Defer       Family History   Problem Relation Age of Onset   • Malig Hyperthermia Neg Hx        Review of Systems   Constitutional: Negative.   Cardiovascular: Negative.    Respiratory: Negative.    Gastrointestinal: Negative.    Genitourinary: Positive for hematuria.       Allergies   Allergen Reactions   • Tamsulosin Unknown - Low Severity     REACTION UNKNOWN   • Ferric Citrate Itching and Rash         Current Outpatient Medications:   •  albuterol sulfate  (90 Base) MCG/ACT inhaler, Inhale 2 puffs Daily As Needed., Disp: , Rfl:   •  atorvastatin (LIPITOR) 40 MG tablet, Take 40 mg by mouth Daily., Disp: , Rfl:   •  Blood Glucose Monitoring Suppl (Accu-Chek Erika Plus) w/Device kit, Test daily before all meals/snacks and once before bedtime., Disp: , Rfl:   •  Budesonide-Formoterol Fumarate (SYMBICORT IN), Inhale 2 puffs Every Morning., Disp: , Rfl:   •  Calcium Carbonate (CALCIUM 600 PO), Take 1 tablet by mouth Every Morning., Disp: , Rfl:   •  ergocalciferol (ERGOCALCIFEROL) 1.25 MG (15218 UT) capsule, Take 50,000 Units by mouth., Disp: , Rfl:   •  furosemide (LASIX) 80 MG tablet, Take 80 mg by mouth 3 (Three) Times a Day., Disp: , Rfl: 3  •  glucose blood (Accu-Chek Erika Plus) test strip, Test daily before all meals/snacks and once before bedtime., Disp: , Rfl:   •  INSULIN GLARGINE SC, Inject 14 Units under the skin into the appropriate area as directed Daily., Disp: , Rfl:   •  Insulin Lispro (HUMALOG KWIKPEN SC), Inject 6 Units under the skin into the appropriate area as directed 3 (Three) Times a Day.,  "Disp: , Rfl:   •  Lancets (accu-chek multiclix) lancets, Test daily before all meals/snacks and once before bedtime., Disp: , Rfl:   •  LANTUS SOLOSTAR 100 UNIT/ML injection pen, Inject  under the skin into the appropriate area as directed 3 (Three) Times a Day Before Meals. SLIDING SCALE, Disp: , Rfl:   •  levothyroxine (SYNTHROID, LEVOTHROID) 175 MCG tablet, Take 175 mcg by mouth Daily., Disp: , Rfl:   •  loratadine (CLARITIN) 10 MG tablet, Take 10 mg by mouth Daily., Disp: , Rfl:   •  melatonin 3 MG tablet, Take 1 tablet by mouth Daily., Disp: , Rfl:   •  multivitamin (Multi-Vitamin Daily) tablet tablet, Take 1 tablet by mouth Daily., Disp: , Rfl:   •  traZODone (DESYREL) 50 MG tablet, TAKE 1/2 TO 1 TABLET BY MOUTH EVERY NIGHT AT BEDTIME AS NEEDED FOR SLEEP, Disp: , Rfl:   •  Vitamins/Minerals tablet, Take 1 tablet by mouth Daily. PT HOLDING FOR SURGERY, Disp: , Rfl:   •  zolpidem (AMBIEN) 5 MG tablet, Take 5 mg by mouth., Disp: , Rfl:   •  amLODIPine (NORVASC) 2.5 MG tablet, Take 2.5 mg by mouth Every Evening., Disp: , Rfl:   •  carvedilol (COREG) 6.25 MG tablet, Take 3.125 mg by mouth Every Evening., Disp: , Rfl:       Objective:     Vitals:    06/29/21 1252   BP: 120/56   Pulse: 72   Weight: 96.2 kg (212 lb)   Height: 190.5 cm (75\")     Body mass index is 26.5 kg/m².    PHYSICAL EXAM:    Vitals and nursing note reviewed.   Constitutional:       Appearance: Normal appearance.   Pulmonary:      Effort: Pulmonary effort is normal.   Cardiovascular:      Normal rate. Regular rhythm.   Edema:     Peripheral edema absent.   Neurological:      Mental Status: Alert and oriented to person, place and time.   Psychiatric:         Attention and Perception: Attention and perception normal.         Mood and Affect: Mood and affect normal.             ECG 12 Lead    Date/Time: 6/30/2021 1:50 PM  Performed by: Alvin Shelton MD  Authorized by: Alvin Shelton MD   Comparison: compared with previous ECG from " 6/1/2021  Similar to previous ECG  Comparison to previous ECG: Rhythm is less regular today  Rhythm: atrial fibrillation        I reviewed his echocardiogram.  He has severe left atrial enlargement.    I reviewed his Holter monitor.  Pauses up to 6.6 seconds noted.      Assessment:       Diagnosis Plan   1. Peritoneal dialysis status (CMS/HCC)     2. Atrial fibrillation, persistent (CMS/HCC)     3. Prostate cancer (CMS/[facility])            Plan:       The patient's underlying rhythm is not entirely clear.  He has been recorded as having permanent atrial fibrillation on previous EKGs.  These EKGs are not available for review.  The rhythm on his EKGs obtained here at [facility] appears a little bit more regular than I would expect for atrial fibrillation, but there is no clear atrial activity.  Possibly atrial flutter, with very low voltages given his massive atrial dilation.  He has no symptoms from the pauses he has never had syncope.  I suspect he will continue to have these pauses, but I do not feel that they pose a threat, and has not had any symptoms.    Pacemaker placement have significant downsides given his end-stage renal disease, risk of infection, and possible limitation of future dialysis access.    As always, it has been a pleasure to participate in your patient's care.      Sincerely,         Alvin Shelton MD

## 2021-06-30 PROBLEM — I48.19 ATRIAL FIBRILLATION, PERSISTENT (HCC): Status: ACTIVE | Noted: 2021-06-30

## 2021-06-30 LAB
AORTIC ARCH: 2.4 CM
ASCENDING AORTA: 3.4 CM
BH CV ECHO MEAS - ACS: 2.5 CM
BH CV ECHO MEAS - AO MAX PG (FULL): 5.9 MMHG
BH CV ECHO MEAS - AO MAX PG: 8.5 MMHG
BH CV ECHO MEAS - AO MEAN PG (FULL): 4 MMHG
BH CV ECHO MEAS - AO MEAN PG: 5 MMHG
BH CV ECHO MEAS - AO ROOT AREA (BSA CORRECTED): 1.5
BH CV ECHO MEAS - AO ROOT AREA: 9.1 CM^2
BH CV ECHO MEAS - AO ROOT DIAM: 3.4 CM
BH CV ECHO MEAS - AO V2 MAX: 146 CM/SEC
BH CV ECHO MEAS - AO V2 MEAN: 107 CM/SEC
BH CV ECHO MEAS - AO V2 VTI: 35.1 CM
BH CV ECHO MEAS - ASC AORTA: 3.4 CM
BH CV ECHO MEAS - AVA(I,A): 1.9 CM^2
BH CV ECHO MEAS - AVA(I,D): 1.9 CM^2
BH CV ECHO MEAS - AVA(V,A): 2.1 CM^2
BH CV ECHO MEAS - AVA(V,D): 2.1 CM^2
BH CV ECHO MEAS - BSA(HAYCOCK): 2.3 M^2
BH CV ECHO MEAS - BSA: 2.3 M^2
BH CV ECHO MEAS - BZI_BMI: 27 KILOGRAMS/M^2
BH CV ECHO MEAS - BZI_METRIC_HEIGHT: 190.5 CM
BH CV ECHO MEAS - BZI_METRIC_WEIGHT: 98 KG
BH CV ECHO MEAS - EDV(MOD-SP2): 131 ML
BH CV ECHO MEAS - EDV(MOD-SP4): 117 ML
BH CV ECHO MEAS - EDV(TEICH): 173.2 ML
BH CV ECHO MEAS - EF(CUBED): 68.8 %
BH CV ECHO MEAS - EF(MOD-BP): 60.2 %
BH CV ECHO MEAS - EF(MOD-SP2): 61.8 %
BH CV ECHO MEAS - EF(MOD-SP4): 59 %
BH CV ECHO MEAS - EF(TEICH): 59.6 %
BH CV ECHO MEAS - ESV(MOD-SP2): 50 ML
BH CV ECHO MEAS - ESV(MOD-SP4): 48 ML
BH CV ECHO MEAS - ESV(TEICH): 70 ML
BH CV ECHO MEAS - FS: 32.2 %
BH CV ECHO MEAS - IVS/LVPW: 1.2
BH CV ECHO MEAS - IVSD: 1.1 CM
BH CV ECHO MEAS - LAT PEAK E' VEL: 14 CM/SEC
BH CV ECHO MEAS - LV DIASTOLIC VOL/BSA (35-75): 51.6 ML/M^2
BH CV ECHO MEAS - LV MASS(C)D: 239.9 GRAMS
BH CV ECHO MEAS - LV MASS(C)DI: 105.8 GRAMS/M^2
BH CV ECHO MEAS - LV MAX PG: 2.6 MMHG
BH CV ECHO MEAS - LV MEAN PG: 1 MMHG
BH CV ECHO MEAS - LV SYSTOLIC VOL/BSA (12-30): 21.2 ML/M^2
BH CV ECHO MEAS - LV V1 MAX: 80.9 CM/SEC
BH CV ECHO MEAS - LV V1 MEAN: 57.4 CM/SEC
BH CV ECHO MEAS - LV V1 VTI: 17.9 CM
BH CV ECHO MEAS - LVIDD: 5.9 CM
BH CV ECHO MEAS - LVIDS: 4 CM
BH CV ECHO MEAS - LVLD AP2: 7.9 CM
BH CV ECHO MEAS - LVLD AP4: 7.8 CM
BH CV ECHO MEAS - LVLS AP2: 7.2 CM
BH CV ECHO MEAS - LVLS AP4: 6.9 CM
BH CV ECHO MEAS - LVOT AREA (M): 3.8 CM^2
BH CV ECHO MEAS - LVOT AREA: 3.8 CM^2
BH CV ECHO MEAS - LVOT DIAM: 2.2 CM
BH CV ECHO MEAS - LVPWD: 0.9 CM
BH CV ECHO MEAS - MED PEAK E' VEL: 9.7 CM/SEC
BH CV ECHO MEAS - MR MAX PG: 83.5 MMHG
BH CV ECHO MEAS - MR MAX VEL: 457 CM/SEC
BH CV ECHO MEAS - MV DEC SLOPE: 433 CM/SEC^2
BH CV ECHO MEAS - MV DEC TIME: 0.23 SEC
BH CV ECHO MEAS - MV E MAX VEL: 101 CM/SEC
BH CV ECHO MEAS - MV MAX PG: 6.7 MMHG
BH CV ECHO MEAS - MV MEAN PG: 3 MMHG
BH CV ECHO MEAS - MV P1/2T MAX VEL: 133 CM/SEC
BH CV ECHO MEAS - MV P1/2T: 90 MSEC
BH CV ECHO MEAS - MV V2 MAX: 129 CM/SEC
BH CV ECHO MEAS - MV V2 MEAN: 78.3 CM/SEC
BH CV ECHO MEAS - MV V2 VTI: 34.7 CM
BH CV ECHO MEAS - MVA P1/2T LCG: 1.7 CM^2
BH CV ECHO MEAS - MVA(P1/2T): 2.4 CM^2
BH CV ECHO MEAS - MVA(VTI): 2 CM^2
BH CV ECHO MEAS - PA ACC TIME: 0.12 SEC
BH CV ECHO MEAS - PA MAX PG (FULL): 1 MMHG
BH CV ECHO MEAS - PA MAX PG: 4.2 MMHG
BH CV ECHO MEAS - PA PR(ACCEL): 26.8 MMHG
BH CV ECHO MEAS - PA V2 MAX: 103 CM/SEC
BH CV ECHO MEAS - PULM DIAS VEL: 102 CM/SEC
BH CV ECHO MEAS - PULM S/D: 0.35
BH CV ECHO MEAS - PULM SYS VEL: 35.3 CM/SEC
BH CV ECHO MEAS - PVA(V,A): 4.6 CM^2
BH CV ECHO MEAS - PVA(V,D): 4.6 CM^2
BH CV ECHO MEAS - QP/QS: 1.5
BH CV ECHO MEAS - RAP SYSTOLE: 8 MMHG
BH CV ECHO MEAS - RV MAX PG: 3.2 MMHG
BH CV ECHO MEAS - RV MEAN PG: 2 MMHG
BH CV ECHO MEAS - RV V1 MAX: 89.4 CM/SEC
BH CV ECHO MEAS - RV V1 MEAN: 64 CM/SEC
BH CV ECHO MEAS - RV V1 VTI: 19 CM
BH CV ECHO MEAS - RVOT AREA: 5.3 CM^2
BH CV ECHO MEAS - RVOT DIAM: 2.6 CM
BH CV ECHO MEAS - RVSP: 52.4 MMHG
BH CV ECHO MEAS - SI(AO): 140.6 ML/M^2
BH CV ECHO MEAS - SI(CUBED): 62.4 ML/M^2
BH CV ECHO MEAS - SI(LVOT): 30 ML/M^2
BH CV ECHO MEAS - SI(MOD-SP2): 35.7 ML/M^2
BH CV ECHO MEAS - SI(MOD-SP4): 30.4 ML/M^2
BH CV ECHO MEAS - SI(TEICH): 45.5 ML/M^2
BH CV ECHO MEAS - SUP REN AO DIAM: 1.5 CM
BH CV ECHO MEAS - SV(AO): 318.7 ML
BH CV ECHO MEAS - SV(CUBED): 141.4 ML
BH CV ECHO MEAS - SV(LVOT): 68 ML
BH CV ECHO MEAS - SV(MOD-SP2): 81 ML
BH CV ECHO MEAS - SV(MOD-SP4): 69 ML
BH CV ECHO MEAS - SV(RVOT): 100.9 ML
BH CV ECHO MEAS - SV(TEICH): 103.2 ML
BH CV ECHO MEAS - TAPSE (>1.6): 2 CM
BH CV ECHO MEAS - TR MAX VEL: 333 CM/SEC
BH CV ECHO MEASUREMENTS AVERAGE E/E' RATIO: 8.52
BH CV XLRA - RV BASE: 4.6 CM
BH CV XLRA - RV LENGTH: 8.2 CM
BH CV XLRA - RV MID: 4.2 CM
BH CV XLRA - TDI S': 12.5 CM/SEC
LEFT ATRIUM VOLUME INDEX: 74 ML/M2
LV EF 2D ECHO EST: 60 %
MAXIMAL PREDICTED HEART RATE: 131 BPM
SINUS: 3.3 CM
STJ: 3.1 CM
STRESS TARGET HR: 111 BPM

## 2021-06-30 PROCEDURE — 93000 ELECTROCARDIOGRAM COMPLETE: CPT | Performed by: INTERNAL MEDICINE

## 2021-07-02 ENCOUNTER — HOSPITAL ENCOUNTER (OUTPATIENT)
Dept: SLEEP MEDICINE | Facility: HOSPITAL | Age: 86
Discharge: HOME | End: 2021-07-02

## 2021-07-02 DIAGNOSIS — G47.10 HYPERSOMNIA: ICD-10-CM

## 2021-07-02 DIAGNOSIS — I45.5 SINUS PAUSE: ICD-10-CM

## 2021-07-06 ENCOUNTER — TELEPHONE (OUTPATIENT)
Dept: CARDIOLOGY | Facility: CLINIC | Age: 86
End: 2021-07-06

## 2021-07-06 NOTE — TELEPHONE ENCOUNTER
Dr. Ruiz out of the office this week.  Patient's echo shows normal EF with severely increased left atrial volume and moderate pulmonary hypertension with valvular sclerosis without significant regurgitation and without stenosis.    Does have known chronic history of atrial fibrillation and also on dialysis with diuretics being managed via nephrology.    Would like to make sure he is feeling well and would have him keep his upcoming July follow-up with Dr. Ruiz to discuss in more detail.    ----------------------------    Called discussed with patient but no answer.  Left a voicemail asking him to call the office back.

## 2021-07-09 NOTE — TELEPHONE ENCOUNTER
Called patient again and again no answer.  Left another voicemail asking him to call the office back.       Linda--  I am out of the office from 7/9-7/21.  Tried again to reach patient this AM before leaving for vacation but unfortunately got voicemail again.   If patient calls back can you please let him know echo results and make sure he is feeling ok?  If no cardiac s/s then would have him proceed with sleep study and keep upcoming appointment with Dr. Ruiz as scheduled or call sooner for issues or concerns.

## 2021-07-12 NOTE — TELEPHONE ENCOUNTER
Swetha,  Patient returning Katies call  He can be reached at [phone]  Thanks  Maddie Hare RN  Triage nurse

## 2021-07-15 ENCOUNTER — TELEPHONE (OUTPATIENT)
Dept: CARDIOLOGY | Facility: CLINIC | Age: 86
End: 2021-07-15

## 2021-07-15 NOTE — TELEPHONE ENCOUNTER
Patient needing to have a TURP with Tylor Breaux  Needs clearance. If on any Blood thinners off 5-7 days prior    [facility] [phone]  Fax [phone]

## 2021-07-15 NOTE — TELEPHONE ENCOUNTER
Pt having TURP procedure with Dr. Snider. They want to know if he is okay to move forward...Dora

## 2021-07-19 ENCOUNTER — HOSPITAL ENCOUNTER (OUTPATIENT)
Dept: SLEEP MEDICINE | Facility: HOSPITAL | Age: 86
Discharge: HOME | End: 2021-07-19
Admitting: INTERNAL MEDICINE

## 2021-07-19 PROCEDURE — 95806 SLEEP STUDY UNATT&RESP EFFT: CPT

## 2021-07-19 PROCEDURE — 95806 SLEEP STUDY UNATT&RESP EFFT: CPT | Performed by: INTERNAL MEDICINE

## 2021-07-23 NOTE — PROGRESS NOTES
Can you please let patient know that his sleep study was normal.  It did not reveal evidence of sleep apnea.

## 2021-07-26 ENCOUNTER — TELEPHONE (OUTPATIENT)
Dept: SLEEP MEDICINE | Facility: HOSPITAL | Age: 86
End: 2021-07-26

## 2021-07-26 ENCOUNTER — TELEPHONE (OUTPATIENT)
Dept: CARDIOLOGY | Facility: CLINIC | Age: 86
End: 2021-07-26

## 2021-07-30 ENCOUNTER — OFFICE VISIT (OUTPATIENT)
Dept: CARDIOLOGY | Facility: CLINIC | Age: 86
End: 2021-07-30

## 2021-07-30 VITALS
WEIGHT: 210 LBS | SYSTOLIC BLOOD PRESSURE: 98 MMHG | OXYGEN SATURATION: 99 % | BODY MASS INDEX: 26.95 KG/M2 | HEART RATE: 83 BPM | HEIGHT: 74 IN | DIASTOLIC BLOOD PRESSURE: 50 MMHG

## 2021-07-30 DIAGNOSIS — I48.19 ATRIAL FIBRILLATION, PERSISTENT (HCC): Primary | ICD-10-CM

## 2021-07-30 PROCEDURE — 93000 ELECTROCARDIOGRAM COMPLETE: CPT | Performed by: INTERNAL MEDICINE

## 2021-07-30 PROCEDURE — 99214 OFFICE O/P EST MOD 30 MIN: CPT | Performed by: INTERNAL MEDICINE

## 2021-07-30 NOTE — PROGRESS NOTES
PATIENTINFORMATION    Date of Office Visit: 2021  Encounter Provider: Wu Ruiz MD  Place of Service: [facility] CARDIOLOGY  Patient Name: Nikita Davis  : 1931    Subjective:     Encounter Date:2021      Patient ID: Nikita Davis is a 89 y.o. male.    Chief Complaint   Patient presents with   • Persistent afib     Prostate surgery    • ESRD on dialysis   • H/o prostate cancer   • Pre-op Exam     HPI  Mr. Davis is an 89 years old man with past medical history of prostate cancer status post radiation, hematuria, permanent atrial fibrillation, end-stage renal disease on hemodialysis came to cardiology clinic for follow-up visit.  He was noted to have several prolonged pauses lasting as long as 7 seconds intraoperatively while having cystoscopy after which procedures discontinued.  Patient was subsequently released on Holter monitor that showed multiple pauses during nighttime but no pauses during daytime.  He still lives by himself and ambulates using a walker or cane when he gets out because of some unsteady gait but denied any passing out or any falls.  He denies any significant palpitations, change in his breathing, chest pain, orthopnea, PND.  He has chronic bilateral lower extremity swelling that has not changed significantly.  He does home peritoneal dialysis at nighttime.  Eliquis was discontinued in the past because of significant hematuria and has not been on antiplatelet either.   He is not on any antihypertensive because of borderline low blood pressure and his current medication includes on Lasix 80 mg daily.    He denied any significant new complaints today      ROS   All systems reviewed and negative except as noted in HPI.    Past Medical History:   Diagnosis Date   • Anemia    • Asthma    • Bruises easily    • CHF (congestive heart failure) (CMS/HCC)    • Diabetes mellitus (CMS/HCC)    • Disease of thyroid gland    • Hematuria    • History of  "transfusion 05/13/2021   • [place] ([place] hearing[place]    • Hyperlipidemia    • Hypertension    • Limited jaw range of motion     RIGHT ELBOW   • Peritoneal dialysis status (CMS/HCC)    • Renal disorder        Past Surgical History:   Procedure Laterality Date   • ADENOIDECTOMY     • APPENDECTOMY     • COLONOSCOPY     • CYSTOSCOPY N/A 6/1/2021    Procedure: CYSTOSCOPY WITH FULGURATION;  Surgeon: Anton Segal MD;  Location: [facility] OR;  Service: Urology;  Laterality: N/A;   • EYE SURGERY     • HIP BIPOLAR REPLACEMENT Left    • MASTOIDECTOMY     • REPLACEMENT TOTAL KNEE Bilateral    • TONSILLECTOMY     • TOTAL KNEE ARTHROPLASTY REVISION Bilateral        Social History     Socioeconomic History   • Marital status:      Spouse name: Not on file   • Number of children: Not on file   • Years of education: Not on file   • Highest education level: Not on file   Tobacco Use   • Smoking status: Former Smoker     Packs/day: 4.00     Years: 10.00     Pack years: 40.00     Types: Cigarettes   • Smokeless tobacco: Never Used   • Tobacco comment: QUIT AGE 40   Vaping Use   • Vaping Use: Never used   Substance and Sexual Activity   • Alcohol use: No   • Drug use: Never   • Sexual activity: Defer       Family History   Problem Relation Age of Onset   • Malig Hyperthermia Neg Hx            ECG 12 Lead    Date/Time: 7/30/2021 10:32 AM  Performed by: Wu Ruiz MD  Authorized by: Wu Ruiz MD   Comparison: compared with previous ECG from 6/29/2021  Rhythm: atrial fibrillation  Ectopy: unifocal PVCs  Rate: normal  Conduction: conduction normal  ST Segments: ST segments normal  T Waves: T waves normal  QRS axis: normal  Other: no other findings    Clinical impression: abnormal EKG               Objective:     BP 98/50   Pulse 83   Ht 188 cm (74\")   Wt 95.3 kg (210 lb)   SpO2 99%   BMI 26.96 kg/m²  Body mass index is 26.96 kg/m².     Constitutional:       General: Not in acute distress.     " Appearance: Well-developed. Not diaphoretic.   Eyes:      Pupils: Pupils are equal, round, and reactive to light.   HENT:      Head: Normocephalic and atraumatic.   Neck:      Thyroid: No thyromegaly.   Pulmonary:      Effort: Pulmonary effort is normal. No respiratory distress.      Breath sounds: Normal breath sounds. No wheezing. No rales.   Chest:      Chest wall: Not tender to palpatation.   Cardiovascular:      Normal rate. Irregularly irregular rhythm.      No gallop.   Pulses:     Intact distal pulses.   Edema:     Peripheral edema absent.   Abdominal:      General: Bowel sounds are normal. There is no distension.      Palpations: Abdomen is soft.      Tenderness: There is no guarding.   Musculoskeletal: Normal range of motion.         General: No deformity.      Cervical back: Normal range of motion and neck supple. Skin:     General: Skin is warm and dry.      Findings: No rash.   Neurological:      Mental Status: Alert and oriented to person, place, and time.      Cranial Nerves: No cranial nerve deficit.      Deep Tendon Reflexes: Reflexes are normal and symmetric.   Psychiatric:         Judgment: Judgment normal.         Review Of Data:   · Holter on 6/1/2020An abnormal 48h monitor study.  · Predominant underlying rhythm sinus/junctional-there is significant baseline artifact and cannot make out P waves clearly. Heart rate ranged between  bpm with an average of 83 bpm.  · Multiple pauses reported with the longest being 6.6 seconds and all happened during sleep. Also the lowest heart rate was recorded during sleep.  No diary or patient triggered events submitted. Multiple episode of A. fib reported with A. fib burden of 4.3% but rhythm strip during reported A. fib were all regular beats with baseline artifacts..    Echocardiogram done on 6/29/2021 revealed preserved left ventricular ejection fraction, RVSP of 52.4 mmHg severely dilated left atrium  Pauses       Assessment/Plan:       1.  Persistent  atrial fibrillation sinus=-EKG today shows A. fib with PVCs-   -He saw EP did not have compelling indication for pacemaker placement.  -Not a candidate for anticoagulation or antiplatelet because of ongoing hematuria-he is going to have urology procedure next week I will decide about anticoagulation or antiplatelet at follow-up visit  -Multiple pauses noted during sleep as long as 6 seconds-no further intervention  -No significant symptoms today  -Continue to monitor    2.  End-stage renal disease peritoneal dialysis  3.  Hyperlipidemia-on statin  4.  Type 2 diabetes mellitus  5.  Metastatic prostate cancer-completed radiation about 2 months. Pending surgery  6. Bilateral lower extremity pitting edema-encouraged elevating leg at night and wear compression stocking during daytime  7.  Hard of hearing-his daughter was helping with history    Encourage more walking with fall precaution    Return to clinic in 3 months.    Diagnosis and plan of care discussed with patient and verbalized understanding.           Wu Ruiz MD  07/30/21  10:49 EDT

## 2021-08-02 ENCOUNTER — PRE-ADMISSION TESTING (OUTPATIENT)
Dept: PREADMISSION TESTING | Facility: HOSPITAL | Age: 86
End: 2021-08-02

## 2021-08-02 VITALS
RESPIRATION RATE: 20 BRPM | OXYGEN SATURATION: 97 % | SYSTOLIC BLOOD PRESSURE: 134 MMHG | WEIGHT: 211 LBS | TEMPERATURE: 97.7 F | BODY MASS INDEX: 26.24 KG/M2 | HEART RATE: 113 BPM | DIASTOLIC BLOOD PRESSURE: 74 MMHG | HEIGHT: 75 IN

## 2021-08-02 LAB
ANION GAP SERPL CALCULATED.3IONS-SCNC: 16.1 MMOL/L (ref 5–15)
BUN SERPL-MCNC: 59 MG/DL (ref 8–23)
BUN/CREAT SERPL: 8.6 (ref 7–25)
CALCIUM SPEC-SCNC: 8.5 MG/DL (ref 8.6–10.5)
CHLORIDE SERPL-SCNC: 101 MMOL/L (ref 98–107)
CO2 SERPL-SCNC: 24.9 MMOL/L (ref 22–29)
CREAT SERPL-MCNC: 6.83 MG/DL (ref 0.76–1.27)
DEPRECATED RDW RBC AUTO: 52.7 FL (ref 37–54)
ERYTHROCYTE [DISTWIDTH] IN BLOOD BY AUTOMATED COUNT: 14 % (ref 12.3–15.4)
GFR SERPL CREATININE-BSD FRML MDRD: 8 ML/MIN/1.73
GFR SERPL CREATININE-BSD FRML MDRD: ABNORMAL ML/MIN/{1.73_M2}
GLUCOSE SERPL-MCNC: 99 MG/DL (ref 65–99)
HCT VFR BLD AUTO: 27.1 % (ref 37.5–51)
HGB BLD-MCNC: 8.5 G/DL (ref 13–17.7)
MCH RBC QN AUTO: 32.2 PG (ref 26.6–33)
MCHC RBC AUTO-ENTMCNC: 31.4 G/DL (ref 31.5–35.7)
MCV RBC AUTO: 102.7 FL (ref 79–97)
PLATELET # BLD AUTO: 172 10*3/MM3 (ref 140–450)
PMV BLD AUTO: 10.4 FL (ref 6–12)
POTASSIUM SERPL-SCNC: 4.6 MMOL/L (ref 3.5–5.2)
RBC # BLD AUTO: 2.64 10*6/MM3 (ref 4.14–5.8)
SARS-COV-2 ORF1AB RESP QL NAA+PROBE: NOT DETECTED
SODIUM SERPL-SCNC: 142 MMOL/L (ref 136–145)
WBC # BLD AUTO: 5.41 10*3/MM3 (ref 3.4–10.8)

## 2021-08-02 PROCEDURE — U0004 COV-19 TEST NON-CDC HGH THRU: HCPCS | Performed by: NURSE PRACTITIONER

## 2021-08-02 PROCEDURE — 85027 COMPLETE CBC AUTOMATED: CPT

## 2021-08-02 PROCEDURE — 80048 BASIC METABOLIC PNL TOTAL CA: CPT

## 2021-08-02 PROCEDURE — U0005 INFEC AGEN DETEC AMPLI PROBE: HCPCS | Performed by: NURSE PRACTITIONER

## 2021-08-02 PROCEDURE — C9803 HOPD COVID-19 SPEC COLLECT: HCPCS | Performed by: NURSE PRACTITIONER

## 2021-08-02 PROCEDURE — 36415 COLL VENOUS BLD VENIPUNCTURE: CPT

## 2021-08-02 NOTE — DISCHARGE INSTRUCTIONS
Take the following medications the morning of surgery with a small sip of water:    Thyroid med  symbicort inhaler     If you are on prescription narcotic pain medication to control your pain you may also take that medication the morning of surgery.    General Instructions:  • Do not eat or drink anything after midnight the night before surgery.  • Infants may have breast milk up to four hours before surgery.  • Infants drinking formula may drink formula up to six hours before surgery.   • Patients who avoid smoking, chewing tobacco and alcohol for 4 weeks prior to surgery have a reduced risk of post-operative complications.  Quit smoking as many days before surgery as you can.  • Do not smoke, use chewing tobacco or drink alcohol the day of surgery.   • If applicable bring your C-PAP/ BI-PAP machine.  • Bring any papers given to you in the doctor’s office.  • Wear clean comfortable clothes.  • Do not wear contact lenses, false eyelashes or make-up.  Bring a case for your glasses.   • Bring crutches or walker if applicable.  • Remove all piercings.  Leave jewelry and any other valuables at home.  • Hair extensions with metal clips must be removed prior to surgery.  • The Pre-Admission Testing nurse will instruct you to bring medications if unable to obtain an accurate list in Pre-Admission Testing.        If you were given a blood bank ID arm band remember to bring it with you the day of surgery.    Preventing a Surgical Site Infection:  • For 2 to 3 days before surgery, avoid shaving with a razor because the razor can irritate skin and make it easier to develop an infection.    • Any areas of open skin can increase the risk of a post-operative wound infection by allowing bacteria to enter and travel throughout the body.  Notify your surgeon if you have any skin wounds / rashes even if it is not near the expected surgical site.  The area will need assessed to determine if surgery should be delayed until it is  healed.  • The night prior to surgery shower using a fresh bar of anti-bacterial soap (such as Dial) and clean washcloth.  Sleep in a clean bed with clean clothing.  Do not allow pets to sleep with you.  • Shower on the morning of surgery using a fresh bar of anti-bacterial soap (such as Dial) and clean washcloth.  Dry with a clean towel and dress in clean clothing.  • Ask your surgeon if you will be receiving antibiotics prior to surgery.  • Make sure you, your family, and all healthcare providers clean their hands with soap and water or an alcohol based hand  before caring for you or your wound.    Day of surgery:8/4/2021  Arrival [id number]  Your arrival time is approximately two hours before your scheduled surgery time.  Upon arrival, a Pre-op nurse and Anesthesiologist will review your health history, obtain vital signs, and answer questions you may have.  The only belongings needed at this time will be your home medications and if applicable your C-PAP/BI-PAP machine.  A Pre-op nurse will start an IV and you may receive medication in preparation for surgery, including something to help you relax.      Please be aware that surgery does come with discomfort.  We want to make every effort to control your discomfort so please discuss any uncontrolled symptoms with your nurse.   Your doctor will most likely have prescribed pain medications.      If you are going home after surgery you will receive individualized written care instructions before being discharged.  A responsible adult must drive you to and from the hospital on the day of your surgery and stay with you for 24 hours.  Discharge prescriptions can be filled by the hospital pharmacy during regular pharmacy hours.  If you are having surgery late in the day/evening your prescription may be e-prescribed to your pharmacy.  Please verify your pharmacy hours or chose a 24 hour pharmacy to avoid not having access to your prescription because your pharmacy  has closed for the day.    If you are staying overnight following surgery, you will be transported to your hospital room following the recovery period.  [facility] has all private rooms.    If you have any questions please call Pre-Admission Testing at [phone].  Deductibles and co-payments are collected on the day of service. Please be prepared to pay the required co-pay, deductible or deposit on the day of service as defined by your plan.    Patient Education for Self-Quarantine Process    • Following your COVID testing, we strongly recommend that you wear a mask when you are with other people and practice social distancing.   • Limit your activities to only required outings.  • Wash your hands with soap and water frequently for at least 20 seconds.   • Avoid touching your eyes, nose and mouth with unwashed hands.  • Do not share anything - utensils, drinking glasses, food from the same bowl.   • Sanitize household surfaces daily. Include all high touch areas (door handles, light switches, phones, countertops, etc.)    Call your surgeon immediately if you experience any of the following symptoms:  • Sore Throat  • Shortness of Breath or difficulty breathing  • Cough  • Chills  • Body soreness or muscle pain  • Headache  • Fever  • New loss of taste or smell  • Do not arrive for your surgery ill.  Your procedure will need to be rescheduled to another time.  You will need to call your physician before the day of surgery to avoid any unnecessary exposure to hospital staff as well as other patients.

## 2021-08-04 ENCOUNTER — HOSPITAL ENCOUNTER (OUTPATIENT)
Facility: HOSPITAL | Age: 86
Discharge: HOME | End: 2021-08-05
Attending: UROLOGY | Admitting: UROLOGY

## 2021-08-04 ENCOUNTER — ANESTHESIA (OUTPATIENT)
Dept: PERIOP | Facility: HOSPITAL | Age: 86
End: 2021-08-04

## 2021-08-04 ENCOUNTER — ANESTHESIA EVENT (OUTPATIENT)
Dept: PERIOP | Facility: HOSPITAL | Age: 86
End: 2021-08-04

## 2021-08-04 DIAGNOSIS — R31.9 HEMATURIA: ICD-10-CM

## 2021-08-04 DIAGNOSIS — R31.0 HEMATURIA, GROSS: Primary | ICD-10-CM

## 2021-08-04 LAB
ANION GAP SERPL CALCULATED.3IONS-SCNC: 15.2 MMOL/L (ref 5–15)
BUN SERPL-MCNC: 62 MG/DL (ref 8–23)
BUN/CREAT SERPL: 10.3 (ref 7–25)
CALCIUM SPEC-SCNC: 8.7 MG/DL (ref 8.6–10.5)
CHLORIDE SERPL-SCNC: 101 MMOL/L (ref 98–107)
CO2 SERPL-SCNC: 26.8 MMOL/L (ref 22–29)
CREAT SERPL-MCNC: 6.04 MG/DL (ref 0.76–1.27)
GFR SERPL CREATININE-BSD FRML MDRD: 9 ML/MIN/1.73
GFR SERPL CREATININE-BSD FRML MDRD: ABNORMAL ML/MIN/{1.73_M2}
GLUCOSE BLDC GLUCOMTR-MCNC: 153 MG/DL (ref 70–130)
GLUCOSE BLDC GLUCOMTR-MCNC: 159 MG/DL (ref 70–130)
GLUCOSE BLDC GLUCOMTR-MCNC: 185 MG/DL (ref 70–130)
GLUCOSE BLDC GLUCOMTR-MCNC: 195 MG/DL (ref 70–130)
GLUCOSE SERPL-MCNC: 160 MG/DL (ref 65–99)
POTASSIUM SERPL-SCNC: 4.3 MMOL/L (ref 3.5–5.2)
SODIUM SERPL-SCNC: 143 MMOL/L (ref 136–145)

## 2021-08-04 PROCEDURE — A9270 NON-COVERED ITEM OR SERVICE: HCPCS | Performed by: UROLOGY

## 2021-08-04 PROCEDURE — 25010000002 FENTANYL CITRATE (PF) 50 MCG/ML SOLUTION: Performed by: NURSE ANESTHETIST, CERTIFIED REGISTERED

## 2021-08-04 PROCEDURE — G0378 HOSPITAL OBSERVATION PER HR: HCPCS

## 2021-08-04 PROCEDURE — 63710000001 FUROSEMIDE 80 MG TABLET: Performed by: UROLOGY

## 2021-08-04 PROCEDURE — 82962 GLUCOSE BLOOD TEST: CPT

## 2021-08-04 PROCEDURE — 63710000001 OPIUM-BELLADONNA 16.2-30 MG SUPPOSITORY: Performed by: UROLOGY

## 2021-08-04 PROCEDURE — 25010000002 LEVOFLOXACIN PER 250 MG: Performed by: NURSE ANESTHETIST, CERTIFIED REGISTERED

## 2021-08-04 PROCEDURE — 84484 ASSAY OF TROPONIN QUANT: CPT | Performed by: INTERNAL MEDICINE

## 2021-08-04 PROCEDURE — 25010000002 PROPOFOL 10 MG/ML EMULSION: Performed by: NURSE ANESTHETIST, CERTIFIED REGISTERED

## 2021-08-04 PROCEDURE — 25010000002 ONDANSETRON PER 1 MG: Performed by: NURSE ANESTHETIST, CERTIFIED REGISTERED

## 2021-08-04 PROCEDURE — 25010000003 CEFAZOLIN IN DEXTROSE 2-4 GM/100ML-% SOLUTION: Performed by: UROLOGY

## 2021-08-04 PROCEDURE — 88305 TISSUE EXAM BY PATHOLOGIST: CPT | Performed by: UROLOGY

## 2021-08-04 PROCEDURE — 80048 BASIC METABOLIC PNL TOTAL CA: CPT | Performed by: UROLOGY

## 2021-08-04 PROCEDURE — 25010000002 LEVOFLOXACIN PER 250 MG: Performed by: UROLOGY

## 2021-08-04 PROCEDURE — 63710000001 CETIRIZINE 10 MG TABLET: Performed by: UROLOGY

## 2021-08-04 PROCEDURE — 63710000001 ATORVASTATIN 20 MG TABLET: Performed by: UROLOGY

## 2021-08-04 RX ORDER — LABETALOL HYDROCHLORIDE 5 MG/ML
5 INJECTION, SOLUTION INTRAVENOUS
Status: DISCONTINUED | OUTPATIENT
Start: 2021-08-04 | End: 2021-08-04 | Stop reason: HOSPADM

## 2021-08-04 RX ORDER — FENTANYL CITRATE 50 UG/ML
INJECTION, SOLUTION INTRAMUSCULAR; INTRAVENOUS AS NEEDED
Status: DISCONTINUED | OUTPATIENT
Start: 2021-08-04 | End: 2021-08-04 | Stop reason: SURG

## 2021-08-04 RX ORDER — INSULIN LISPRO 100 [IU]/ML
0-9 INJECTION, SOLUTION INTRAVENOUS; SUBCUTANEOUS
Status: DISCONTINUED | OUTPATIENT
Start: 2021-08-05 | End: 2021-08-05 | Stop reason: HOSPADM

## 2021-08-04 RX ORDER — SODIUM CHLORIDE 450 MG/100ML
75 INJECTION, SOLUTION INTRAVENOUS CONTINUOUS
Status: DISCONTINUED | OUTPATIENT
Start: 2021-08-04 | End: 2021-08-05 | Stop reason: HOSPADM

## 2021-08-04 RX ORDER — CEFAZOLIN SODIUM 2 G/100ML
2 INJECTION, SOLUTION INTRAVENOUS 2 TIMES DAILY
Status: COMPLETED | OUTPATIENT
Start: 2021-08-04 | End: 2021-08-05

## 2021-08-04 RX ORDER — ATORVASTATIN CALCIUM 20 MG/1
40 TABLET, FILM COATED ORAL NIGHTLY
Status: DISCONTINUED | OUTPATIENT
Start: 2021-08-04 | End: 2021-08-05 | Stop reason: HOSPADM

## 2021-08-04 RX ORDER — FLUMAZENIL 0.1 MG/ML
0.2 INJECTION INTRAVENOUS AS NEEDED
Status: DISCONTINUED | OUTPATIENT
Start: 2021-08-04 | End: 2021-08-04 | Stop reason: HOSPADM

## 2021-08-04 RX ORDER — PROMETHAZINE HYDROCHLORIDE 25 MG/1
25 TABLET ORAL ONCE AS NEEDED
Status: DISCONTINUED | OUTPATIENT
Start: 2021-08-04 | End: 2021-08-04 | Stop reason: HOSPADM

## 2021-08-04 RX ORDER — NICOTINE POLACRILEX 4 MG
15 LOZENGE BUCCAL
Status: DISCONTINUED | OUTPATIENT
Start: 2021-08-04 | End: 2021-08-05 | Stop reason: HOSPADM

## 2021-08-04 RX ORDER — GLYCOPYRROLATE 0.2 MG/ML
INJECTION INTRAMUSCULAR; INTRAVENOUS AS NEEDED
Status: DISCONTINUED | OUTPATIENT
Start: 2021-08-04 | End: 2021-08-04 | Stop reason: SURG

## 2021-08-04 RX ORDER — EPHEDRINE SULFATE 50 MG/ML
5 INJECTION, SOLUTION INTRAVENOUS ONCE AS NEEDED
Status: DISCONTINUED | OUTPATIENT
Start: 2021-08-04 | End: 2021-08-04 | Stop reason: HOSPADM

## 2021-08-04 RX ORDER — LEVOFLOXACIN 5 MG/ML
INJECTION, SOLUTION INTRAVENOUS AS NEEDED
Status: DISCONTINUED | OUTPATIENT
Start: 2021-08-04 | End: 2021-08-04 | Stop reason: SURG

## 2021-08-04 RX ORDER — ALBUTEROL SULFATE 2.5 MG/3ML
2.5 SOLUTION RESPIRATORY (INHALATION) DAILY PRN
Status: DISCONTINUED | OUTPATIENT
Start: 2021-08-04 | End: 2021-08-05 | Stop reason: HOSPADM

## 2021-08-04 RX ORDER — PROPOFOL 10 MG/ML
VIAL (ML) INTRAVENOUS AS NEEDED
Status: DISCONTINUED | OUTPATIENT
Start: 2021-08-04 | End: 2021-08-04 | Stop reason: SURG

## 2021-08-04 RX ORDER — FENTANYL CITRATE 50 UG/ML
50 INJECTION, SOLUTION INTRAMUSCULAR; INTRAVENOUS
Status: DISCONTINUED | OUTPATIENT
Start: 2021-08-04 | End: 2021-08-04 | Stop reason: HOSPADM

## 2021-08-04 RX ORDER — HYDROCODONE BITARTRATE AND ACETAMINOPHEN 5; 325 MG/1; MG/1
1 TABLET ORAL EVERY 4 HOURS PRN
Status: DISCONTINUED | OUTPATIENT
Start: 2021-08-04 | End: 2021-08-05 | Stop reason: HOSPADM

## 2021-08-04 RX ORDER — LIDOCAINE HYDROCHLORIDE 10 MG/ML
0.5 INJECTION, SOLUTION EPIDURAL; INFILTRATION; INTRACAUDAL; PERINEURAL ONCE AS NEEDED
Status: DISCONTINUED | OUTPATIENT
Start: 2021-08-04 | End: 2021-08-04 | Stop reason: HOSPADM

## 2021-08-04 RX ORDER — SODIUM CHLORIDE, SODIUM LACTATE, POTASSIUM CHLORIDE, CALCIUM CHLORIDE 600; 310; 30; 20 MG/100ML; MG/100ML; MG/100ML; MG/100ML
9 INJECTION, SOLUTION INTRAVENOUS CONTINUOUS
Status: DISCONTINUED | OUTPATIENT
Start: 2021-08-04 | End: 2021-08-04

## 2021-08-04 RX ORDER — LEVOFLOXACIN 5 MG/ML
500 INJECTION, SOLUTION INTRAVENOUS ONCE
Status: COMPLETED | OUTPATIENT
Start: 2021-08-04 | End: 2021-08-04

## 2021-08-04 RX ORDER — PROMETHAZINE HYDROCHLORIDE 25 MG/1
25 SUPPOSITORY RECTAL ONCE AS NEEDED
Status: DISCONTINUED | OUTPATIENT
Start: 2021-08-04 | End: 2021-08-04 | Stop reason: HOSPADM

## 2021-08-04 RX ORDER — BUDESONIDE AND FORMOTEROL FUMARATE DIHYDRATE 80; 4.5 UG/1; UG/1
2 AEROSOL RESPIRATORY (INHALATION) EVERY MORNING
Status: DISCONTINUED | OUTPATIENT
Start: 2021-08-05 | End: 2021-08-05 | Stop reason: HOSPADM

## 2021-08-04 RX ORDER — INSULIN GLARGINE 100 [IU]/ML
15 INJECTION, SOLUTION SUBCUTANEOUS EVERY MORNING
Status: DISCONTINUED | OUTPATIENT
Start: 2021-08-05 | End: 2021-08-05 | Stop reason: HOSPADM

## 2021-08-04 RX ORDER — HYDROCODONE BITARTRATE AND ACETAMINOPHEN 7.5; 325 MG/1; MG/1
1 TABLET ORAL ONCE AS NEEDED
Status: DISCONTINUED | OUTPATIENT
Start: 2021-08-04 | End: 2021-08-04 | Stop reason: HOSPADM

## 2021-08-04 RX ORDER — DEXTROSE MONOHYDRATE 25 G/50ML
25 INJECTION, SOLUTION INTRAVENOUS
Status: DISCONTINUED | OUTPATIENT
Start: 2021-08-04 | End: 2021-08-05 | Stop reason: HOSPADM

## 2021-08-04 RX ORDER — SODIUM CHLORIDE 0.9 % (FLUSH) 0.9 %
3-10 SYRINGE (ML) INJECTION AS NEEDED
Status: DISCONTINUED | OUTPATIENT
Start: 2021-08-04 | End: 2021-08-04 | Stop reason: HOSPADM

## 2021-08-04 RX ORDER — FUROSEMIDE 80 MG
80 TABLET ORAL 3 TIMES DAILY
Status: DISCONTINUED | OUTPATIENT
Start: 2021-08-04 | End: 2021-08-05 | Stop reason: HOSPADM

## 2021-08-04 RX ORDER — HYDROMORPHONE HYDROCHLORIDE 1 MG/ML
0.5 INJECTION, SOLUTION INTRAMUSCULAR; INTRAVENOUS; SUBCUTANEOUS
Status: DISCONTINUED | OUTPATIENT
Start: 2021-08-04 | End: 2021-08-04 | Stop reason: HOSPADM

## 2021-08-04 RX ORDER — MAGNESIUM HYDROXIDE 1200 MG/15ML
LIQUID ORAL AS NEEDED
Status: DISCONTINUED | OUTPATIENT
Start: 2021-08-04 | End: 2021-08-04 | Stop reason: HOSPADM

## 2021-08-04 RX ORDER — DOCUSATE SODIUM 100 MG/1
100 CAPSULE, LIQUID FILLED ORAL 2 TIMES DAILY PRN
Status: DISCONTINUED | OUTPATIENT
Start: 2021-08-04 | End: 2021-08-05 | Stop reason: HOSPADM

## 2021-08-04 RX ORDER — DIPHENHYDRAMINE HCL 25 MG
25 CAPSULE ORAL
Status: DISCONTINUED | OUTPATIENT
Start: 2021-08-04 | End: 2021-08-04 | Stop reason: HOSPADM

## 2021-08-04 RX ORDER — DIPHENHYDRAMINE HYDROCHLORIDE 50 MG/ML
12.5 INJECTION INTRAMUSCULAR; INTRAVENOUS
Status: DISCONTINUED | OUTPATIENT
Start: 2021-08-04 | End: 2021-08-04 | Stop reason: HOSPADM

## 2021-08-04 RX ORDER — SODIUM CHLORIDE 9 MG/ML
INJECTION, SOLUTION INTRAVENOUS CONTINUOUS PRN
Status: DISCONTINUED | OUTPATIENT
Start: 2021-08-04 | End: 2021-08-04 | Stop reason: SURG

## 2021-08-04 RX ORDER — ONDANSETRON 2 MG/ML
4 INJECTION INTRAMUSCULAR; INTRAVENOUS ONCE AS NEEDED
Status: DISCONTINUED | OUTPATIENT
Start: 2021-08-04 | End: 2021-08-04 | Stop reason: HOSPADM

## 2021-08-04 RX ORDER — NALOXONE HCL 0.4 MG/ML
0.2 VIAL (ML) INJECTION AS NEEDED
Status: DISCONTINUED | OUTPATIENT
Start: 2021-08-04 | End: 2021-08-04 | Stop reason: HOSPADM

## 2021-08-04 RX ORDER — ACETAMINOPHEN 325 MG/1
650 TABLET ORAL EVERY 4 HOURS PRN
Status: DISCONTINUED | OUTPATIENT
Start: 2021-08-04 | End: 2021-08-05 | Stop reason: HOSPADM

## 2021-08-04 RX ORDER — ATROPA BELLADONNA AND OPIUM 16.2; 3 MG/1; MG/1
SUPPOSITORY RECTAL AS NEEDED
Status: DISCONTINUED | OUTPATIENT
Start: 2021-08-04 | End: 2021-08-04 | Stop reason: HOSPADM

## 2021-08-04 RX ORDER — MIDAZOLAM HYDROCHLORIDE 1 MG/ML
0.5 INJECTION INTRAMUSCULAR; INTRAVENOUS
Status: DISCONTINUED | OUTPATIENT
Start: 2021-08-04 | End: 2021-08-04 | Stop reason: HOSPADM

## 2021-08-04 RX ORDER — SODIUM CHLORIDE 9 MG/ML
9 INJECTION, SOLUTION INTRAVENOUS CONTINUOUS
Status: DISCONTINUED | OUTPATIENT
Start: 2021-08-04 | End: 2021-08-05 | Stop reason: HOSPADM

## 2021-08-04 RX ORDER — IBUPROFEN 600 MG/1
600 TABLET ORAL ONCE AS NEEDED
Status: DISCONTINUED | OUTPATIENT
Start: 2021-08-04 | End: 2021-08-04 | Stop reason: HOSPADM

## 2021-08-04 RX ORDER — LEVOTHYROXINE SODIUM 175 UG/1
175 TABLET ORAL EVERY MORNING
Status: DISCONTINUED | OUTPATIENT
Start: 2021-08-05 | End: 2021-08-05 | Stop reason: HOSPADM

## 2021-08-04 RX ORDER — HYDRALAZINE HYDROCHLORIDE 20 MG/ML
5 INJECTION INTRAMUSCULAR; INTRAVENOUS
Status: DISCONTINUED | OUTPATIENT
Start: 2021-08-04 | End: 2021-08-04 | Stop reason: HOSPADM

## 2021-08-04 RX ORDER — FAMOTIDINE 10 MG/ML
20 INJECTION, SOLUTION INTRAVENOUS ONCE
Status: COMPLETED | OUTPATIENT
Start: 2021-08-04 | End: 2021-08-04

## 2021-08-04 RX ORDER — ACETAMINOPHEN 650 MG/1
650 SUPPOSITORY RECTAL EVERY 4 HOURS PRN
Status: DISCONTINUED | OUTPATIENT
Start: 2021-08-04 | End: 2021-08-05 | Stop reason: HOSPADM

## 2021-08-04 RX ORDER — LIDOCAINE HYDROCHLORIDE 20 MG/ML
INJECTION, SOLUTION INFILTRATION; PERINEURAL AS NEEDED
Status: DISCONTINUED | OUTPATIENT
Start: 2021-08-04 | End: 2021-08-04 | Stop reason: SURG

## 2021-08-04 RX ORDER — SODIUM CHLORIDE 0.9 % (FLUSH) 0.9 %
3 SYRINGE (ML) INJECTION EVERY 12 HOURS SCHEDULED
Status: DISCONTINUED | OUTPATIENT
Start: 2021-08-04 | End: 2021-08-04 | Stop reason: HOSPADM

## 2021-08-04 RX ORDER — ONDANSETRON 2 MG/ML
INJECTION INTRAMUSCULAR; INTRAVENOUS AS NEEDED
Status: DISCONTINUED | OUTPATIENT
Start: 2021-08-04 | End: 2021-08-04 | Stop reason: SURG

## 2021-08-04 RX ORDER — OXYCODONE AND ACETAMINOPHEN 10; 325 MG/1; MG/1
1 TABLET ORAL EVERY 4 HOURS PRN
Status: DISCONTINUED | OUTPATIENT
Start: 2021-08-04 | End: 2021-08-04 | Stop reason: HOSPADM

## 2021-08-04 RX ORDER — CETIRIZINE HYDROCHLORIDE 10 MG/1
5 TABLET ORAL DAILY
Status: DISCONTINUED | OUTPATIENT
Start: 2021-08-04 | End: 2021-08-05 | Stop reason: HOSPADM

## 2021-08-04 RX ADMIN — FENTANYL CITRATE 50 MCG: 50 INJECTION, SOLUTION INTRAMUSCULAR; INTRAVENOUS at 16:08

## 2021-08-04 RX ADMIN — SODIUM CHLORIDE 75 ML/HR: 4.5 INJECTION, SOLUTION INTRAVENOUS at 17:30

## 2021-08-04 RX ADMIN — LEVOFLOXACIN 500 MG: 500 INJECTION, SOLUTION INTRAVENOUS at 13:45

## 2021-08-04 RX ADMIN — LEVOFLOXACIN 500 MG: 5 INJECTION, SOLUTION INTRAVENOUS at 14:58

## 2021-08-04 RX ADMIN — CEFAZOLIN SODIUM 2 G: 2 INJECTION, SOLUTION INTRAVENOUS at 21:21

## 2021-08-04 RX ADMIN — LIDOCAINE HYDROCHLORIDE 80 MG: 20 INJECTION, SOLUTION INFILTRATION; PERINEURAL at 14:39

## 2021-08-04 RX ADMIN — FENTANYL CITRATE 25 MCG: 50 INJECTION INTRAMUSCULAR; INTRAVENOUS at 14:39

## 2021-08-04 RX ADMIN — CETIRIZINE HYDROCHLORIDE 5 MG: 10 TABLET ORAL at 18:58

## 2021-08-04 RX ADMIN — ONDANSETRON 4 MG: 2 INJECTION INTRAMUSCULAR; INTRAVENOUS at 14:56

## 2021-08-04 RX ADMIN — SODIUM CHLORIDE: 9 INJECTION, SOLUTION INTRAVENOUS at 13:53

## 2021-08-04 RX ADMIN — FAMOTIDINE 20 MG: 10 INJECTION INTRAVENOUS at 13:36

## 2021-08-04 RX ADMIN — PROPOFOL 100 MG: 10 INJECTION, EMULSION INTRAVENOUS at 14:39

## 2021-08-04 RX ADMIN — SODIUM CHLORIDE 9 ML/HR: 9 INJECTION, SOLUTION INTRAVENOUS at 13:25

## 2021-08-04 RX ADMIN — GLYCOPYRROLATE 0.3 MG: 0.2 INJECTION INTRAMUSCULAR; INTRAVENOUS at 14:37

## 2021-08-04 RX ADMIN — PROPOFOL 50 MG: 10 INJECTION, EMULSION INTRAVENOUS at 14:41

## 2021-08-04 RX ADMIN — FENTANYL CITRATE 50 MCG: 50 INJECTION, SOLUTION INTRAMUSCULAR; INTRAVENOUS at 15:50

## 2021-08-04 RX ADMIN — ATORVASTATIN CALCIUM 40 MG: 20 TABLET, FILM COATED ORAL at 21:21

## 2021-08-04 RX ADMIN — FUROSEMIDE 80 MG: 80 TABLET ORAL at 21:21

## 2021-08-04 NOTE — ANESTHESIA PROCEDURE NOTES
Airway  Urgency: elective    Date/Time: 8/4/2021 2:41 PM  Airway not difficult    General Information and Staff    Patient location during procedure: OR  Anesthesiologist: Tom Lopes MD  CRNA: Ishmael Lopes CRNA    Indications and Patient Condition  Indications for airway management: airway protection    Preoxygenated: yes  MILS not maintained throughout  Mask difficulty assessment: 1 - vent by mask    Final Airway Details  Final airway type: supraglottic airway      Successful airway: unique and LMA  Size 5    Number of attempts at approach: 1  Assessment: lips, teeth, and gum same as pre-op    Additional Comments  Pre O2, SIAI

## 2021-08-04 NOTE — ANESTHESIA PREPROCEDURE EVALUATION
Anesthesia Evaluation     Patient summary reviewed and Nursing notes reviewed                Airway   Mallampati: II  TM distance: >3 FB  Neck ROM: limited  Dental    (+) poor dentition, upper dentures and partials    Pulmonary    (+) a smoker Former, asthma,  Cardiovascular     ECG reviewed  Rhythm: irregular  Rate: normal    (+) hypertension, valvular problems/murmurs murmur, dysrhythmias Atrial Fib, CHF , hyperlipidemia,       Neuro/Psych- negative ROS  GI/Hepatic/Renal/Endo    (+)   renal disease ESRD, diabetes mellitus type 2 using insulin,     Musculoskeletal     Abdominal    Substance History - negative use     OB/GYN negative ob/gyn ROS         Other   arthritis,    history of cancer                    Anesthesia Plan    ASA 4     general   (I have reviewed the patient's history with the patient and the chart, including all pertinent laboratory results and imaging. I have explained the risks of anesthesia including but not limited to dental damage, corneal abrasion, nerve injury, MI, stroke, and death. Questions asked and answered. Anesthetic plan discussed with patient and team as indicated. Patient expressed understanding of the above.  )  intravenous induction     Anesthetic plan, all risks, benefits, and alternatives have been provided, discussed and informed consent has been obtained with: patient.

## 2021-08-04 NOTE — OP NOTE
PREOPERATIVE DIAGNOSIS: Gross hematuria, metastatic prostate cancer.    POSTOPERATIVE DIAGNOSIS: Same    PROCEDURE: Cystoscopy transurethral resection of prostate with fulguration of bladder varicosities.    SURGEON:  Anton Segal MD    ASSISTANT: None    ANESTHESIA: General    EBL: 20 cc    DRAINS: 24 Emirati three-way irrigation catheter    COMPLICATIONS: None    FINDINGS: BPH with multiple inflamed bladder varicosities.    INDICATIONS FOR PROCEDURE: History of metastatic prostate cancer radiation therapy in the past.  Patient has developed intractable hematuria with treatment.  He has been admitted several times for low hemoglobin.  He presents today for cystoscopy TURP.  Risk and benefits were explained include but not limited to bleeding, infection, damage to urethra and bladder.  Patient consented to the procedure.    DESCRIPTION OF PROCEDURE: After receiving of antibiotics he was taken to the cystoscopy suite underwent a general anesthesia.  After adequate anesthesia was obtained he placed in dorsal lithotomy position.  His groin was prepped and draped sterile fashion.  A 27 Emirati sheath with resectoscope was placed into the bladder.  Prostate showed moderate obstruction.  In the bladder itself there is quite a bit of necrotic tissue along with bladder varicosities.  I resected some of this what appears to be metastatic prostate cancer at the bladder base.  A small amount of tissue at the bladder neck/median lobe was removed.  Meticulous hemostasis obtained with cauterization multiple bladder varicosities were fulgurated.  Once hemostasis was obtained all the chips irrigated free resectoscope was removed.  I then placed a 24 Emirati three-way irrigation cath in the bladder.  30 cc were placed in the balloon irrigation was started normal saline Landa bag was placed.  He was then extubated taken recovery in satisfactory condition he tolerated procedure well.  All the chips were handed off the table specimen.

## 2021-08-04 NOTE — PLAN OF CARE
Goal Outcome Evaluation:   VSS. Patient came up from OR where he got a TURP he has continuous bladder irrigation going with sterile water. Output has been clear and light pink. He is alert and oriented x 4. He has an IV in Right forearm with 1/2 NS going at 75 ml/hr. He also has a dialysis catheter in his left side abdomen. Red area on bottom that is blanchable and I placed a mepilex on coccyx area.

## 2021-08-04 NOTE — H&P
FIRST UROLOGY CONSULT      Patient Identification:  NAME:  Nikita Davis  Age:  89 y.o.   Sex:  male   :  1931   MRN:  [id number]       Chief complaint: Hematuria.      History of present illness:  Gross hematuria. Metastatic CaP. FOr TURP.        Past medical history:  Past Medical History:   Diagnosis Date   • Anemia    • Arthritis    • Asthma    • Atrial fibrillation (CMS/HCC)    • Bruises easily    • Bruises easily    • CHF (congestive heart failure) (CMS/HCC)    • Diabetes mellitus (CMS/HCC)    • Disease of thyroid gland    • ESRD (end stage renal disease) (CMS/HCC)    • Heart murmur    • Hematuria    • History of transfusion 2021    no reaction   • [place] ([place] hearing[place]    • Hyperlipidemia    • Hypertension    • Limited jaw range of motion     RIGHT ELBOW   • Peritoneal dialysis status (CMS/HCC)    • Renal disorder        Past surgical history:  Past Surgical History:   Procedure Laterality Date   • ADENOIDECTOMY     • APPENDECTOMY     • COLONOSCOPY     • CYSTOSCOPY N/A 2021    Procedure: CYSTOSCOPY WITH FULGURATION;  Surgeon: Anton Segal MD;  Location: [facility];  Service: Urology;  Laterality: N/A;   • EYE SURGERY     • HIP BIPOLAR REPLACEMENT Left    • MASTOIDECTOMY     • REPLACEMENT TOTAL KNEE Bilateral    • TONSILLECTOMY     • TOTAL KNEE ARTHROPLASTY REVISION Bilateral        Allergies:  Tamsulosin and Ferric citrate    Home medications:  Medications Prior to Admission   Medication Sig Dispense Refill Last Dose   • albuterol sulfate  (90 Base) MCG/ACT inhaler Inhale 2 puffs Daily As Needed.      • atorvastatin (LIPITOR) 40 MG tablet Take 40 mg by mouth Every Night.      • Blood Glucose Monitoring Suppl (Accu-Chek Erika Plus) w/Device kit Test daily before all meals/snacks and once before bedtime.      • Budesonide-Formoterol Fumarate (SYMBICORT IN) Inhale 2 puffs Every Morning.      • Calcium Carbonate (CALCIUM 600 PO) Take 1 tablet by mouth Every Night.       • ergocalciferol (ERGOCALCIFEROL) 1.25 MG (60098 UT) capsule Take 50,000 Units by mouth 1 (One) Time Per Week.      • furosemide (LASIX) 80 MG tablet Take 80 mg by mouth 3 (Three) Times a Day.  3    • glucose blood (Accu-Chek Erika Plus) test strip Test daily before all meals/snacks and once before bedtime.      • INSULIN GLARGINE SC Inject 15 Units under the skin into the appropriate area as directed Every Morning. Lantus      • Insulin Lispro (HUMALOG KWIKPEN SC) Inject 6-16 Units under the skin into the appropriate area as directed 3 (Three) Times a Day. Pt on sliding scale 100-150 6 u, 150-200 8u, 200-250 10u, 250-300 12u, 300-350 14 u, 350-400 16u      • Lancets (accu-chek multiclix) lancets Test daily before all meals/snacks and once before bedtime.      • levothyroxine (SYNTHROID, LEVOTHROID) 175 MCG tablet Take  by mouth Daily.      • loratadine (CLARITIN) 10 MG tablet Take 10 mg by mouth Daily.      • multivitamin (Multi-Vitamin Daily) tablet tablet Take 1 tablet by mouth Daily.      • traZODone (DESYREL) 50 MG tablet Take  mg by mouth Every Night.           Hospital medications:    No current facility-administered medications for this encounter.        Family history:  Family History   Problem Relation Age of Onset   • Malig Hyperthermia Neg Hx        Social history:  Social History     Tobacco Use   • Smoking status: Former Smoker     Packs/day: 4.00     Years: 10.00     Pack years: 40.00     Types: Cigarettes     Quit date:      Years since quittin.6   • Smokeless tobacco: Never Used   • Tobacco comment: QUIT AGE 40   Vaping Use   • Vaping Use: Never used   Substance Use Topics   • Alcohol use: Yes     Comment: 4 yearly   • Drug use: Never       Review of systems:      Positive for:  Hematuria.    Negative for:  Fever.      Objective:  TMax 24 hours:   No data recorded.      Vitals Ranges:        Intake/Output Last 3 shifts:  No intake/output data recorded.     Physical Exam:    General  Appearance:    Alert, cooperative, NAD   HEENT:    No trauma, pupils reactive, hearing intact   Back:     No CVA tenderness   Lungs:     Respirations unlabored, no wheezing    Heart:    RRR.   Abdomen:     Soft, NDNT, no masses, no guarding   :      Extremities:   No edema, no deformity   Lymphatic:   No neck or groin LAD   Skin:   No bleeding, bruising or rashes   Neuro/Psych:   Orientation intact, mood/affect pleasant, no focal findings       Results review:   I reviewed the patient's new clinical results.    Data review:  Lab Results (last 24 hours)     ** No results found for the last 24 hours. **           Imaging:  Imaging Results (Last 24 Hours)     ** No results found for the last 24 hours. **             Assessment:       Hematuria, gross    Hematuria.      Plan:     Cysto TURP.  R/B d/w pt.      Anton Segal MD  08/04/21  12:44 EDT

## 2021-08-04 NOTE — ANESTHESIA POSTPROCEDURE EVALUATION
"Patient: Nikita Davis    Procedure Summary     Date: 08/04/21 Room / Location: [facility] OR 01 / [facility] MAIN OR    Anesthesia Start: 1422 Anesthesia Stop: 1512    Procedure: TRANSURETHRAL RESECTION OF PROSTATE (N/A ) Diagnosis:     Surgeons: Anton Segal MD Provider: Tom Lopes MD    Anesthesia Type: general ASA Status: 4          Anesthesia Type: general    Vitals  Vitals Value Taken Time   /65 08/04/21 1640   Temp 36.6 °C (97.8 °F) 08/04/21 1625   Pulse 64 08/04/21 1650   Resp 16 08/04/21 1625   SpO2 99 % 08/04/21 1650   Vitals shown include unvalidated device data.        Post Anesthesia Care and Evaluation    Patient location: Pt discharged prior to being evaluated by anesthesia.  Anesthetic complications: No anesthetic complications    Cardiovascular status: acceptable  Respiratory status: acceptable    Comments: Record reviewed. No complications noted.    /69   Pulse 64   Temp 36.6 °C (97.8 °F) (Oral)   Resp 16   Ht 188 cm (74\")   Wt 95.5 kg (210 lb 8.6 oz)   SpO2 96%   BMI 27.03 kg/m²         "

## 2021-08-04 NOTE — CONSULTS
CONSULT NOTE    INTERNAL MEDICINE   [facility]       Patient Identification:  Name: Nikita Davis  Age: 89 y.o.  Sex: male  :  1931  MRN: [id number]             Date of Consultation: 21          Primary Care Physician: Lio Beach MD                               Requesting Physician: dr stoll  Reason for Consultation:medical management    Chief Complaint:  89 year old gentleman who was admitted by urology; he had gross hematuria and has prostate cancer; turp was done; we are asked to see him regarding medical management; he has a history of esrd, hypothyroidism, asthma, hypertension among others; he is hard of hearing but is presently denying pain or shortness of breath    History of Present Illness:   As above      Past Medical History:  Past Medical History:   Diagnosis Date   • Anemia    • Arthritis    • Asthma    • Atrial fibrillation (CMS/HCC)    • Bruises easily    • Bruises easily    • CHF (congestive heart failure) (CMS/HCC)    • Diabetes mellitus (CMS/HCC)    • Disease of thyroid gland    • ESRD (end stage renal disease) (CMS/HCC)    • Heart murmur    • Hematuria    • History of transfusion 2021    no reaction   • [place] ([place] hearing[place]    • Hyperlipidemia    • Hypertension    • Limited jaw range of motion     RIGHT ELBOW   • Peritoneal dialysis status (CMS/HCC)    • Renal disorder      Past Surgical History:  Past Surgical History:   Procedure Laterality Date   • ADENOIDECTOMY     • APPENDECTOMY     • COLONOSCOPY     • CYSTOSCOPY N/A 2021    Procedure: CYSTOSCOPY WITH FULGURATION;  Surgeon: Anton Stoll MD;  Location: [facility];  Service: Urology;  Laterality: N/A;   • EYE SURGERY     • HIP BIPOLAR REPLACEMENT Left    • MASTOIDECTOMY     • REPLACEMENT TOTAL KNEE Bilateral    • TONSILLECTOMY     • TOTAL KNEE ARTHROPLASTY REVISION Bilateral       Home Meds:  Medications Prior to Admission   Medication Sig Dispense Refill Last Dose   •  atorvastatin (LIPITOR) 40 MG tablet Take 40 mg by mouth Every Night.   8/3/2021 at 2100   • Budesonide-Formoterol Fumarate (SYMBICORT IN) Inhale 2 puffs Every Morning.   8/4/2021 at 0700   • Calcium Carbonate (CALCIUM 600 PO) Take 1 tablet by mouth Every Night.   Past Week at 2100   • furosemide (LASIX) 80 MG tablet Take 80 mg by mouth 3 (Three) Times a Day.  3 8/3/2021 at 2100   • INSULIN GLARGINE SC Inject 15 Units under the skin into the appropriate area as directed Every Morning. Lantus   8/3/2021 at 0800   • Insulin Lispro (HUMALOG KWIKPEN SC) Inject 6-16 Units under the skin into the appropriate area as directed 3 (Three) Times a Day. Pt on sliding scale 100-150 6 u, 150-200 8u, 200-250 10u, 250-300 12u, 300-350 14 u, 350-400 16u   8/3/2021 at 1800   • levothyroxine (SYNTHROID, LEVOTHROID) 175 MCG tablet Take  by mouth Daily.   8/3/2021 at 0800   • loratadine (CLARITIN) 10 MG tablet Take 10 mg by mouth Daily.   8/3/2021 at 1400   • multivitamin (Multi-Vitamin Daily) tablet tablet Take 1 tablet by mouth Daily.   Past Week at Unknown time   • traZODone (DESYREL) 50 MG tablet Take  mg by mouth Every Night.   8/3/2021 at 2100   • VITAMIN D PO Take 1 tablet by mouth Daily.   8/3/2021 at 0800   • albuterol sulfate  (90 Base) MCG/ACT inhaler Inhale 2 puffs Daily As Needed.   More than a month at Unknown time   • Blood Glucose Monitoring Suppl (Accu-Chek Erika Plus) w/Device kit Test daily before all meals/snacks and once before bedtime.      • ergocalciferol (ERGOCALCIFEROL) 1.25 MG (30442 UT) capsule Take 50,000 Units by mouth 1 (One) Time Per Week.   Unknown at Unknown time   • glucose blood (Accu-Chek Erika Plus) test strip Test daily before all meals/snacks and once before bedtime.      • Lancets (accu-chek multiclix) lancets Test daily before all meals/snacks and once before bedtime.        Current Meds:     Current Facility-Administered Medications:   •  acetaminophen (TYLENOL) tablet 650 mg, 650  mg, Oral, Q4H PRN **OR** acetaminophen (TYLENOL) suppository 650 mg, 650 mg, Rectal, Q4H PRN, Anton Segal MD  •  albuterol (PROVENTIL) nebulizer solution 0.083% 2.5 mg/3mL, 2.5 mg, Nebulization, Daily PRN, Anton Segal MD  •  atorvastatin (LIPITOR) tablet 40 mg, 40 mg, Oral, Nightly, Anton Segal MD  •  [START ON 8/5/2021] budesonide-formoterol (SYMBICORT) 80-4.5 MCG/ACT inhaler 2 puff, 2 puff, Inhalation, DILIP, Anton Segal MD  •  ceFAZolin in dextrose (ANCEF) IVPB solution 2 g, 2 g, Intravenous, BID, Anton Segal MD  •  cetirizine (zyrTEC) tablet 5 mg, 5 mg, Oral, Daily, Anton Segal MD, 5 mg at 08/04/21 1858  •  docusate sodium (COLACE) capsule 100 mg, 100 mg, Oral, BID PRN, Anton Segal MD  •  furosemide (LASIX) tablet 80 mg, 80 mg, Oral, TID, Anton Segal MD  •  HYDROcodone-acetaminophen (NORCO) 5-325 MG per tablet 1 tablet, 1 tablet, Oral, Q4H PRN, Anton Segal MD  •  [START ON 8/5/2021] levothyroxine (SYNTHROID, LEVOTHROID) tablet 175 mcg, 175 mcg, Oral, QAM, Anton Segal MD  •  sodium chloride 0.45 % infusion, 75 mL/hr, Intravenous, Continuous, Anton Segal MD, Last Rate: 75 mL/hr at 08/04/21 1730, 75 mL/hr at 08/04/21 1730  •  sodium chloride 0.9 % infusion, 9 mL/hr, Intravenous, Continuous, Choco Khan MD, Last Rate: 9 mL/hr at 08/04/21 1325, 9 mL/hr at 08/04/21 1325  Allergies:  Allergies   Allergen Reactions   • Tamsulosin Rash   • Ferric Citrate Itching and Rash     Phosphorous binder     Social History:   Social History     Socioeconomic History   • Marital status:      Spouse name: Not on file   • Number of children: Not on file   • Years of education: Not on file   • Highest education level: Not on file   Tobacco Use   • Smoking status: Former Smoker     Packs/day: 4.00     Years: 10.00     Pack years: 40.00     Types: Cigarettes     Quit date: 1972     Years  "since quittin.6   • Smokeless tobacco: Never Used   • Tobacco comment: QUIT AGE 40   Vaping Use   • Vaping Use: Never used   Substance and Sexual Activity   • Alcohol use: Yes     Comment: 4 yearly   • Drug use: Never   • Sexual activity: Defer     Family History:  Family History   Problem Relation Age of Onset   • Malig Hyperthermia Neg Hx           Review of Systems  See history of present illness and past medical history.  Patient denies headache, dizziness, syncope, falls, trauma, change in vision, change in hearing, change in taste, changes in weight, changes in appetite, focal weakness, numbness, or paresthesia.  Patient denies chest pain, palpitations, dyspnea, orthopnea, PND, cough, sinus pressure, rhinorrhea, epistaxis, hemoptysis, nausea, vomiting,hematemesis, diarrhea, constipation or hematchezia.  Denies cold or heat intolerance, polydipsia, polyuria, polyphagia. Denies hematuria, pyuria, dysuria, hesitancy, frequency or urgency. Denies consumption of raw and under cooked meats foods or change in water source.  Denies fever, chills, sweats, night sweats.  Denies missing any routine medications. Remainder of ROS is negative.      Vitals:   /62 (BP Location: Right arm, Patient Position: Lying)   Pulse 60   Temp 96.6 °F (35.9 °C) (Oral)   Resp 16   Ht 188 cm (74\")   Wt 95.5 kg (210 lb 8.6 oz)   SpO2 95%   BMI 27.03 kg/m²   I/O: No intake or output data in the 24 hours ending 21  Exam:  General Appearance:    Alert, cooperative, no distress, appears stated age   Head:    Normocephalic, without obvious abnormality, atraumatic   Eyes:    PERRL, conjunctivae/corneas clear, EOM's intact, both eyes   Ears:    Normal external ear canals, both ears   Nose:   Nares normal, septum midline, mucosa normal, no drainage    or sinus tenderness   Throat:   Lips, tongue, gums normal; oral mucosa pink and moist   Neck:   Supple, symmetrical, trachea midline, no adenopathy;     thyroid:  no " enlargement/tenderness/nodules; no carotid    bruit or JVD   Back:     Symmetric, no curvature, ROM normal, no CVA tenderness   Lungs:     Clear to auscultation bilaterally, respirations unlabored   Chest Wall:    No tenderness or deformity    Heart:    Regular rate and rhythm, S1 and S2 normal, no murmur, rub   or gallop   Abdomen:     Soft, nontender, bowel sounds active all four quadrants,     no masses, no hepatomegaly, no splenomegaly   Extremities:   Extremities normal, atraumatic, no cyanosis or edema   Pulses:   Pulses palpable in all extremities; symmetric all extremities   Skin:   Skin color normal, Skin is warm and dry,  no rashes or palpable lesions   Neurologic:   CNII-XII intact, motor strength grossly intact, sensation grossly intact to light touch, no focal deficits noted       Data Review:  Labs in chart were reviewed.  WBC   Date Value Ref Range Status   08/02/2021 5.41 3.40 - 10.80 10*3/mm3 Final     Hemoglobin   Date Value Ref Range Status   08/02/2021 8.5 (L) 13.0 - 17.7 g/dL Final     Hematocrit   Date Value Ref Range Status   08/02/2021 27.1 (L) 37.5 - 51.0 % Final     Platelets   Date Value Ref Range Status   08/02/2021 172 140 - 450 10*3/mm3 Final     Sodium   Date Value Ref Range Status   08/04/2021 143 136 - 145 mmol/L Final     Potassium   Date Value Ref Range Status   08/04/2021 4.3 3.5 - 5.2 mmol/L Final     Chloride   Date Value Ref Range Status   08/04/2021 101 98 - 107 mmol/L Final     CO2   Date Value Ref Range Status   08/04/2021 26.8 22.0 - 29.0 mmol/L Final     BUN   Date Value Ref Range Status   08/04/2021 62 (H) 8 - 23 mg/dL Final     Creatinine   Date Value Ref Range Status   08/04/2021 6.04 (H) 0.76 - 1.27 mg/dL Final     Glucose   Date Value Ref Range Status   08/04/2021 160 (H) 65 - 99 mg/dL Final     Calcium   Date Value Ref Range Status   08/04/2021 8.7 8.6 - 10.5 mg/dL Final     No results found for: AST, ALT, ALKPHOS            Imaging Results (Last 7 Days)     ** No  results found for the last 168 hours. **        Past Medical History:   Diagnosis Date   • Anemia    • Arthritis    • Asthma    • Atrial fibrillation (CMS/[facility])    • Bruises easily    • Bruises easily    • CHF (congestive heart failure) (CMS/[facility])    • Diabetes mellitus (CMS/[facility])    • Disease of thyroid gland    • ESRD (end stage renal disease) (CMS/[facility])    • Heart murmur    • Hematuria    • History of transfusion 05/13/2021    no reaction   • [place] ([place] hearing[place]    • Hyperlipidemia    • Hypertension    • Limited jaw range of motion     RIGHT ELBOW   • Peritoneal dialysis status (CMS/[facility])    • Renal disorder        Assessment:  Active Hospital Problems    Diagnosis  POA   • Hematuria, gross [R31.0]  Yes      Resolved Hospital Problems   No resolved problems to display.   diabetes  Hypertension  Chronic diastolic chf  Hypothyroidism  esrd   anemia    Plan:  accu checks, insulin sliding scale  Consult nephrology to continue dialysis  Monitor blood pressure and hgb  Thanks for involving us in his care  Will follow with you  Tiara Thomas MD   8/4/2021  19:41 EDT

## 2021-08-05 VITALS
OXYGEN SATURATION: 97 % | WEIGHT: 210.54 LBS | HEART RATE: 54 BPM | RESPIRATION RATE: 16 BRPM | TEMPERATURE: 97.5 F | HEIGHT: 74 IN | SYSTOLIC BLOOD PRESSURE: 132 MMHG | DIASTOLIC BLOOD PRESSURE: 67 MMHG | BODY MASS INDEX: 27.02 KG/M2

## 2021-08-05 PROBLEM — E03.9 HYPOTHYROIDISM (ACQUIRED): Status: ACTIVE | Noted: 2021-08-05

## 2021-08-05 PROBLEM — N18.6 ESRD (END STAGE RENAL DISEASE) (HCC): Status: ACTIVE | Noted: 2021-08-05

## 2021-08-05 PROBLEM — Z79.4 TYPE 2 DIABETES MELLITUS WITH KIDNEY COMPLICATION, WITH LONG-TERM CURRENT USE OF INSULIN (HCC): Status: ACTIVE | Noted: 2021-08-05

## 2021-08-05 PROBLEM — E78.5 HYPERLIPIDEMIA: Status: ACTIVE | Noted: 2021-08-05

## 2021-08-05 PROBLEM — I10 ESSENTIAL HYPERTENSION: Status: ACTIVE | Noted: 2021-08-05

## 2021-08-05 PROBLEM — E11.29 TYPE 2 DIABETES MELLITUS WITH KIDNEY COMPLICATION, WITH LONG-TERM CURRENT USE OF INSULIN (HCC): Status: ACTIVE | Noted: 2021-08-05

## 2021-08-05 LAB
ALBUMIN SERPL-MCNC: 3.3 G/DL (ref 3.5–5.2)
ALBUMIN/GLOB SERPL: 1.3 G/DL
ALP SERPL-CCNC: 73 U/L (ref 39–117)
ALT SERPL W P-5'-P-CCNC: 12 U/L (ref 1–41)
ANION GAP SERPL CALCULATED.3IONS-SCNC: 13.8 MMOL/L (ref 5–15)
AST SERPL-CCNC: 17 U/L (ref 1–40)
BILIRUB SERPL-MCNC: <0.2 MG/DL (ref 0–1.2)
BUN SERPL-MCNC: 57 MG/DL (ref 8–23)
BUN/CREAT SERPL: 9 (ref 7–25)
CALCIUM SPEC-SCNC: 8.3 MG/DL (ref 8.6–10.5)
CHLORIDE SERPL-SCNC: 99 MMOL/L (ref 98–107)
CO2 SERPL-SCNC: 25.2 MMOL/L (ref 22–29)
CREAT SERPL-MCNC: 6.33 MG/DL (ref 0.76–1.27)
DEPRECATED RDW RBC AUTO: 49.5 FL (ref 37–54)
ERYTHROCYTE [DISTWIDTH] IN BLOOD BY AUTOMATED COUNT: 13.5 % (ref 12.3–15.4)
GFR SERPL CREATININE-BSD FRML MDRD: 8 ML/MIN/1.73
GFR SERPL CREATININE-BSD FRML MDRD: ABNORMAL ML/MIN/{1.73_M2}
GLOBULIN UR ELPH-MCNC: 2.5 GM/DL
GLUCOSE BLDC GLUCOMTR-MCNC: 159 MG/DL (ref 70–130)
GLUCOSE BLDC GLUCOMTR-MCNC: 171 MG/DL (ref 70–130)
GLUCOSE BLDC GLUCOMTR-MCNC: 195 MG/DL (ref 70–130)
GLUCOSE SERPL-MCNC: 173 MG/DL (ref 65–99)
HBA1C MFR BLD: 5.71 % (ref 4.8–5.6)
HCT VFR BLD AUTO: 24.4 % (ref 37.5–51)
HGB BLD-MCNC: 7.7 G/DL (ref 13–17.7)
IRON 24H UR-MRATE: 38 MCG/DL (ref 59–158)
IRON SATN MFR SERPL: 19 % (ref 20–50)
MCH RBC QN AUTO: 31.8 PG (ref 26.6–33)
MCHC RBC AUTO-ENTMCNC: 31.6 G/DL (ref 31.5–35.7)
MCV RBC AUTO: 100.8 FL (ref 79–97)
PLATELET # BLD AUTO: 115 10*3/MM3 (ref 140–450)
PMV BLD AUTO: 10 FL (ref 6–12)
POTASSIUM SERPL-SCNC: 4.7 MMOL/L (ref 3.5–5.2)
PROT SERPL-MCNC: 5.8 G/DL (ref 6–8.5)
RBC # BLD AUTO: 2.42 10*6/MM3 (ref 4.14–5.8)
SODIUM SERPL-SCNC: 138 MMOL/L (ref 136–145)
TIBC SERPL-MCNC: 201 MCG/DL (ref 298–536)
TRANSFERRIN SERPL-MCNC: 135 MG/DL (ref 200–360)
TROPONIN T SERPL-MCNC: 0.27 NG/ML (ref 0–0.03)
WBC # BLD AUTO: 5.56 10*3/MM3 (ref 3.4–10.8)

## 2021-08-05 PROCEDURE — A9270 NON-COVERED ITEM OR SERVICE: HCPCS | Performed by: UROLOGY

## 2021-08-05 PROCEDURE — G0378 HOSPITAL OBSERVATION PER HR: HCPCS

## 2021-08-05 PROCEDURE — 63710000001 CETIRIZINE 10 MG TABLET: Performed by: UROLOGY

## 2021-08-05 PROCEDURE — 63710000001 INSULIN GLARGINE PER 5 UNITS: Performed by: INTERNAL MEDICINE

## 2021-08-05 PROCEDURE — 83036 HEMOGLOBIN GLYCOSYLATED A1C: CPT | Performed by: INTERNAL MEDICINE

## 2021-08-05 PROCEDURE — 82962 GLUCOSE BLOOD TEST: CPT

## 2021-08-05 PROCEDURE — 85027 COMPLETE CBC AUTOMATED: CPT | Performed by: UROLOGY

## 2021-08-05 PROCEDURE — 99213 OFFICE O/P EST LOW 20 MIN: CPT | Performed by: INTERNAL MEDICINE

## 2021-08-05 PROCEDURE — 63710000001 LEVOTHYROXINE 175 MCG TABLET: Performed by: UROLOGY

## 2021-08-05 PROCEDURE — 80053 COMPREHEN METABOLIC PANEL: CPT | Performed by: INTERNAL MEDICINE

## 2021-08-05 PROCEDURE — 63710000001 FUROSEMIDE 80 MG TABLET: Performed by: UROLOGY

## 2021-08-05 PROCEDURE — 63710000001 INSULIN LISPRO (HUMAN) PER 5 UNITS: Performed by: INTERNAL MEDICINE

## 2021-08-05 PROCEDURE — 63710000001 ACETAMINOPHEN 325 MG TABLET: Performed by: UROLOGY

## 2021-08-05 PROCEDURE — A9270 NON-COVERED ITEM OR SERVICE: HCPCS | Performed by: INTERNAL MEDICINE

## 2021-08-05 PROCEDURE — 84466 ASSAY OF TRANSFERRIN: CPT | Performed by: NURSE PRACTITIONER

## 2021-08-05 PROCEDURE — 25010000003 CEFAZOLIN IN DEXTROSE 2-4 GM/100ML-% SOLUTION: Performed by: UROLOGY

## 2021-08-05 PROCEDURE — 83540 ASSAY OF IRON: CPT | Performed by: NURSE PRACTITIONER

## 2021-08-05 RX ORDER — CEPHALEXIN 500 MG/1
500 CAPSULE ORAL DAILY
Qty: 5 CAPSULE | Refills: 0 | Status: SHIPPED | OUTPATIENT
Start: 2021-08-05 | End: 2021-08-10

## 2021-08-05 RX ADMIN — LEVOTHYROXINE SODIUM 175 MCG: 0.17 TABLET ORAL at 06:29

## 2021-08-05 RX ADMIN — CETIRIZINE HYDROCHLORIDE 5 MG: 10 TABLET ORAL at 08:47

## 2021-08-05 RX ADMIN — SODIUM CHLORIDE 75 ML/HR: 4.5 INJECTION, SOLUTION INTRAVENOUS at 06:29

## 2021-08-05 RX ADMIN — ACETAMINOPHEN 650 MG: 325 TABLET, FILM COATED ORAL at 00:59

## 2021-08-05 RX ADMIN — INSULIN LISPRO 2 UNITS: 100 INJECTION, SOLUTION INTRAVENOUS; SUBCUTANEOUS at 08:47

## 2021-08-05 RX ADMIN — INSULIN LISPRO 2 UNITS: 100 INJECTION, SOLUTION INTRAVENOUS; SUBCUTANEOUS at 12:33

## 2021-08-05 RX ADMIN — CEFAZOLIN SODIUM 2 G: 2 INJECTION, SOLUTION INTRAVENOUS at 06:39

## 2021-08-05 RX ADMIN — INSULIN GLARGINE 15 UNITS: 100 INJECTION, SOLUTION SUBCUTANEOUS at 08:46

## 2021-08-05 RX ADMIN — FUROSEMIDE 80 MG: 80 TABLET ORAL at 08:48

## 2021-08-05 NOTE — PROGRESS NOTES
Name: Nikita Davis ADMIT: 2021   : 1931  PCP: Lio Beach MD    MRN: [id number] LOS: 0 days   AGE/SEX: 89 y.o. male  ROOM: [facility]     Subjective   Subjective   Referring provider: Dr. Segal  Reason for follow-up: type 2 DM  No acute events. Patient is overall feeling better. Denies significant pain. Taking PO. No CP/dyspnea/f/c/n/v/d.    Objective   Objective   Vital Signs  Temp:  [97.3 °F (36.3 °C)-97.8 °F (36.6 °C)] 97.5 °F (36.4 °C)  Heart Rate:  [31-58] 54  Resp:  [16-18] 16  BP: (106-168)/(60-69) 132/67  SpO2:  [97 %-99 %] 97 %  on   ;   Device (Oxygen Therapy): room air  Body mass index is 27.03 kg/m².  Physical Exam  Vitals and nursing note reviewed.   Constitutional:       General: He is not in acute distress.     Appearance: He is not toxic-appearing or diaphoretic.   HENT:      Head: Normocephalic and atraumatic.      Nose: Nose normal.      Mouth/Throat:      Mouth: Mucous membranes are moist.      Pharynx: Oropharynx is clear.   Eyes:      Extraocular Movements: Extraocular movements intact.      Conjunctiva/sclera: Conjunctivae normal.      Pupils: Pupils are equal, round, and reactive to light.   Cardiovascular:      Rate and Rhythm: Normal rate and regular rhythm.      Pulses: Normal pulses.   Pulmonary:      Effort: Pulmonary effort is normal.      Breath sounds: Normal breath sounds.   Abdominal:      General: Bowel sounds are normal.      Palpations: Abdomen is soft.      Tenderness: There is no abdominal tenderness.   Genitourinary:     Comments: Bladder catheter in place with cbi  Musculoskeletal:         General: No swelling or tenderness.      Cervical back: Normal range of motion and neck supple.   Skin:     General: Skin is warm and dry.      Capillary Refill: Capillary refill takes less than 2 seconds.   Neurological:      General: No focal deficit present.      Mental Status: He is alert and oriented to person, place, and time.   Psychiatric:         Mood and Affect:  Mood normal.         Behavior: Behavior normal.       Results Review     I reviewed the patient's new clinical results.  I reviewed the patient's telemetry.  Results from last 7 days   Lab Units 08/05/21  0530 08/02/21  1618   WBC 10*3/mm3 5.56 5.41   HEMOGLOBIN g/dL 7.7* 8.5*   PLATELETS 10*3/mm3 115* 172     Results from last 7 days   Lab Units 08/05/21  0530 08/04/21  1304 08/02/21  1618   SODIUM mmol/L 138 143 142   POTASSIUM mmol/L 4.7 4.3 4.6   CHLORIDE mmol/L 99 101 101   CO2 mmol/L 25.2 26.8 24.9   BUN mg/dL 57* 62* 59*   CREATININE mg/dL 6.33* 6.04* 6.83*   GLUCOSE mg/dL 173* 160* 99   Estimated Creatinine Clearance: 10.7 mL/min (A) (by C-G formula based on SCr of 6.33 mg/dL (H)).  Results from last 7 days   Lab Units 08/05/21  0530   ALBUMIN g/dL 3.30*   BILIRUBIN mg/dL <0.2   ALK PHOS U/L 73   AST (SGOT) U/L 17   ALT (SGPT) U/L 12     Results from last 7 days   Lab Units 08/05/21  0530 08/04/21  1304 08/02/21  1618   CALCIUM mg/dL 8.3* 8.7 8.5*   ALBUMIN g/dL 3.30*  --   --        COVID19   Date Value Ref Range Status   08/02/2021 Not Detected Not Detected - Ref. Range Final   05/29/2021 Not Detected Not Detected - Ref. Range Final     Hemoglobin A1C   Date/Time Value Ref Range Status   08/05/2021 0530 5.71 (H) 4.80 - 5.60 % Final     Glucose   Date/Time Value Ref Range Status   08/05/2021 1636 159 (H) 70 - 130 mg/dL Final     Comment:     Meter: [id number] : [id number] Johana LONG   08/05/2021 1153 195 (H) 70 - 130 mg/dL Final     Comment:     Meter: [id number] : [id number] Johana LONG   08/05/2021 0643 171 (H) 70 - 130 mg/dL Final     Comment:     Meter: [id number] : [id number] Jonathan Mace   08/04/2021 2046 195 (H) 70 - 130 mg/dL Final     Comment:     Meter: [id number] : [id number] Allen Jeffery    08/04/2021 1812 185 (H) 70 - 130 mg/dL Final     Comment:     Meter: [id number] : [id number] Vickey Fraser    08/04/2021 1514 159 (H) 70 -  130 mg/dL Final     Comment:     Meter: [id number] : [id number] Spenser Fernandez RN   08/04/2021 1244 153 (H) 70 - 130 mg/dL Final     Comment:     Meter: [id number] : [id number] Michele WALLIS       Home Sleep Study  [facility] Sleep Test Report 7/19/2021    Nikita Davis  9/20/1931  [id number]    Interpreting Physician: Robin Lacey MD    Referring Physician:  Robin Lacey MD    Primary Care Physician: Lio Beach MD    Clinical Information: Patient is a 89 y.o. male who has a history   significant for chronic diastolic heart failure, ESRD on dialysis,   diabetes, anemia, hypothyroidism, permanent atrial fibrillation,   nonocclusive CAD, low-grade mitral regurgitation, hypertension and   pericardial effusion. His last echo in 2019 showed moderate mitral   regurgitation with moderate to severe tricuspid regurgitation with an LVEF   of 50% (taken from Dr. Thomas's note).  The patient was recently diagnosed   with metastatic prostate cancer.  The patient was noted to have pauses in   addition to his atrial fibrillation.  Cardiology requested a home sleep   study.    The patient goes to bed at 10 PM and awakens at 7 AM.  He is tired upon   awakening.  He will take 2 naps daily.  The patient's [place] Sleepiness   Scale is abnormal at 16.    Height 75 inches, weight 212 pounds and BMI overweight at 26.5.    Methods: Study performed based on the standardized protocol.    Home Sleep Study Findings:   Recording start time 8:56 PM and recording end time 2:57 AM. Total   recording time 361 minutes and monitoring time 273 minutes.     The patient had no obstructive events and 12 partial obstructive events.   AHI for total monitoring time is normal at 2.6 events per hour.    Lowest oxygen saturation is 88% and no significant sleep-related hypoxia   noted.    Mean heart rate is 53.4 with a high heart rate 102 and a low heart rate of   37 bpm.    1 total snoring episodes noted and percent  time snoring less than 1%.    Impression:   Hypersomnolence, unspecified, G 47.10.  Sleep disordered breathing, G   47.30, without evidence of obstructive sleep apnea or sleep-related   hypoxia noted on this home sleep study.    Plan:   Good sleep hygiene measures should be explained to the patient and   followed.  Some weight loss would be beneficial in this patient who is   overweight by body mass index.    As stated above, no obstructive sleep apnea or sleep-related hypoxia   identified on this home sleep study.  The patient reported that the amount   of sleep that he normally gets, how restful his sleep was, and all in all,   how his sleep was, was the same as usual.    This home sleep study does not identify obstructive sleep apnea or explain   the patient's hypersomnolence.  The patient will follow up with   cardiology.  If symptoms persist or if there is a high pretest probability   of having obstructive sleep apnea, please arrange sleep evaluation.    Robin Lacey MD  Sleep Medicine  07/23/21  07:26 EDT    Scheduled Medications  atorvastatin, 40 mg, Oral, Nightly  budesonide-formoterol, 2 puff, Inhalation, QAM  cetirizine, 5 mg, Oral, Daily  furosemide, 80 mg, Oral, TID  insulin glargine, 15 Units, Subcutaneous, QAM  insulin lispro, 0-9 Units, Subcutaneous, TID With Meals  levothyroxine, 175 mcg, Oral, QAM    Infusions  sodium chloride, 75 mL/hr, Last Rate: Stopped (08/05/21 0730)  sodium chloride, 9 mL/hr, Last Rate: 9 mL/hr (08/04/21 1325)    Diet  Diet Regular; Cardiac, Consistent Carbohydrate       Assessment/Plan     Active Hospital Problems    Diagnosis  POA   • Type 2 diabetes mellitus with kidney complication, with long-term current use of insulin (CMS/[facility]) [E11.29, Z79.4]  Not Applicable   • Essential hypertension [I10]  Yes   • Hyperlipidemia [E78.5]  Yes   • ESRD (end stage renal disease) (CMS/[facility]) [N18.6]  Yes   • Hypothyroidism (acquired) [E03.9]  Yes   • Hematuria, gross [R31.0]  Yes       Resolved Hospital Problems   No resolved problems to display.   Type 2 DM  - complications include ESRD  - continue on lantus 15 units QAM  - cover with ssi/hypoglycemia protocol    ESRD  - patient is on PD  - nephrology following    HTN  - BP acceptable, continue on current regimen    Hypothyroidism  - continue on levothyroxine    Asymptomatic Bradycardia  - no further workup/treatment indicated  - cardiology has evaluated    Hematuria  - s/p turp  - management per primary      Disposition per primary team. Okay to discharge at any time from medicine standpoint.    Aleksey Mead MD  [facility]  08/05/21  17:08 EDT

## 2021-08-05 NOTE — PLAN OF CARE
Goal Outcome Evaluation:           Progress: improving  Outcome Summary: PT IS POD#1 FROM A TURP. PAIN WELL CONTROLLED WITH PO TYLENOL. CONTINUES WITH CONTINUOUS BLADDER IRRIG. URINE LIGHT PINK IN F/C. PERITONEAL SHUNT CLAMPED. CARDIO AND NEPHRO CONSULTED. HR DECREASED INTO THE 40S BUT ONLY WHEN PT ASLEEP. PT ON ROOM AIR,

## 2021-08-05 NOTE — PLAN OF CARE
Goal Outcome Evaluation:    All goals met for d/c    To home via daughter, IV & telemetry removed.

## 2021-08-05 NOTE — CONSULTS
Patient Name: Nikita Davis  Age/Sex: 89 y.o. male  : 1931  MRN: [id number]    Date of Admission: 2021  Date of Encounter Visit: 21  Encounter Provider: Alvin Shelton MD  Place of Service: [facility] CARDIOLOGY      Referring Provider: Anton Segal, *  Patient Care Team:  Lio Beach MD as PCP - General (Internal Medicine)  , MD Mesfin as Consulting Physician (Cardiology)  Fran De Guzman MD as Consulting Physician (Nephrology)  Wu Ruiz MD as Consulting Physician (Cardiology)    Subjective:       Chief Complaint:   Status post TURP, hematuria    History of Present Illness:  Nikita Davis is a 89 y.o. male with history of bradycardia.      The patient has been experiencing hematuria for several months, he was scheduled for TURP, but that was delayed due to intraoperative hematuria.  I saw him in clinic.  He denied any symptoms from bradycardia.  At that time he was in sinus rhythm with long first degree AV Block.  As he was asymptomatic I recommended no pacemaker at that time and to proceed with surgery.  He had this completed yesterday, and by review of the notes, it appears that there was no significant issue.    Overnight, nursing notes describe some bradycardia, but the patient doesn't report any difficulties.  He feels well, and is anxious to be discharged home.           Past Medical History:  Past Medical History:   Diagnosis Date   • Anemia    • Arthritis    • Asthma    • Atrial fibrillation (CMS/[facility])    • Bruises easily    • Bruises easily    • CHF (congestive heart failure) (CMS/[facility])    • Diabetes mellitus (CMS/[facility])    • Disease of thyroid gland    • ESRD (end stage renal disease) (CMS/[facility])    • Heart murmur    • Hematuria    • History of transfusion 2021    no reaction   • [place] ([place] hearing[place]    • Hyperlipidemia    • Hypertension    • Limited jaw range of motion     RIGHT ELBOW   • Peritoneal dialysis  status (CMS/[facility])    • Renal disorder        Past Surgical History:   Procedure Laterality Date   • ADENOIDECTOMY     • APPENDECTOMY     • COLONOSCOPY     • CYSTOSCOPY N/A 6/1/2021    Procedure: CYSTOSCOPY WITH FULGURATION;  Surgeon: Anton Segal MD;  Location: [facility] OR;  Service: Urology;  Laterality: N/A;   • EYE SURGERY     • HIP BIPOLAR REPLACEMENT Left    • MASTOIDECTOMY     • REPLACEMENT TOTAL KNEE Bilateral    • TONSILLECTOMY     • TOTAL KNEE ARTHROPLASTY REVISION Bilateral        Home Medications:   Medications Prior to Admission   Medication Sig Dispense Refill Last Dose   • atorvastatin (LIPITOR) 40 MG tablet Take 40 mg by mouth Every Night.   8/3/2021 at 2100   • Budesonide-Formoterol Fumarate (SYMBICORT IN) Inhale 2 puffs Every Morning.   8/4/2021 at 0700   • Calcium Carbonate (CALCIUM 600 PO) Take 1 tablet by mouth Every Night.   Past Week at 2100   • furosemide (LASIX) 80 MG tablet Take 80 mg by mouth 3 (Three) Times a Day.  3 8/3/2021 at 2100   • INSULIN GLARGINE SC Inject 15 Units under the skin into the appropriate area as directed Every Morning. Lantus   8/3/2021 at 0800   • Insulin Lispro (HUMALOG KWIKPEN SC) Inject 6-16 Units under the skin into the appropriate area as directed 3 (Three) Times a Day. Pt on sliding scale 100-150 6 u, 150-200 8u, 200-250 10u, 250-300 12u, 300-350 14 u, 350-400 16u   8/3/2021 at 1800   • levothyroxine (SYNTHROID, LEVOTHROID) 175 MCG tablet Take  by mouth Daily.   8/3/2021 at 0800   • loratadine (CLARITIN) 10 MG tablet Take 10 mg by mouth Daily.   8/3/2021 at 1400   • multivitamin (Multi-Vitamin Daily) tablet tablet Take 1 tablet by mouth Daily.   Past Week at Unknown time   • traZODone (DESYREL) 50 MG tablet Take  mg by mouth Every Night.   8/3/2021 at 2100   • VITAMIN D PO Take 1 tablet by mouth Daily.   8/3/2021 at 0800   • albuterol sulfate  (90 Base) MCG/ACT inhaler Inhale 2 puffs Daily As Needed.   More than a month at Unknown time    • Blood Glucose Monitoring Suppl (Accu-Chek Erika Plus) w/Device kit Test daily before all meals/snacks and once before bedtime.      • ergocalciferol (ERGOCALCIFEROL) 1.25 MG (11185 UT) capsule Take 50,000 Units by mouth 1 (One) Time Per Week.   Unknown at Unknown time   • glucose blood (Accu-Chek Erika Plus) test strip Test daily before all meals/snacks and once before bedtime.      • Lancets (accu-chek multiclix) lancets Test daily before all meals/snacks and once before bedtime.          Allergies:  Allergies   Allergen Reactions   • Tamsulosin Rash   • Ferric Citrate Itching and Rash     Phosphorous binder       Past Social History:  Social History     Socioeconomic History   • Marital status:      Spouse name: Not on file   • Number of children: Not on file   • Years of education: Not on file   • Highest education level: Not on file   Tobacco Use   • Smoking status: Former Smoker     Packs/day: 4.00     Years: 10.00     Pack years: 40.00     Types: Cigarettes     Quit date:      Years since quittin.6   • Smokeless tobacco: Never Used   • Tobacco comment: QUIT AGE 40   Vaping Use   • Vaping Use: Never used   Substance and Sexual Activity   • Alcohol use: Yes     Comment: 4 yearly   • Drug use: Never   • Sexual activity: Defer        Past Family History:  Family History   Problem Relation Age of Onset   • Malig Hyperthermia Neg Hx        Review of Systems: All systems reviewed. Pertinent positives identified in HPI. All other systems are negative.     Review of Systems   Constitutional: Negative for malaise/fatigue.   HENT: Negative.    Eyes: Negative.    Cardiovascular: Negative for chest pain, dyspnea on exertion, leg swelling and near-syncope.   Respiratory: Negative for cough and shortness of breath.    Endocrine: Negative.    Hematologic/Lymphatic: Negative.    Skin: Negative.    Musculoskeletal: Negative.    Gastrointestinal: Negative.    Genitourinary: Negative.    Neurological:  Negative.  Negative for weakness.   Psychiatric/Behavioral: Negative.    Allergic/Immunologic: Negative.          Objective:     Objective:  Temp:  [96.6 °F (35.9 °C)-97.8 °F (36.6 °C)] 97.3 °F (36.3 °C)  Heart Rate:  [31-77] 58  Resp:  [16-20] 18  BP: (106-168)/(54-70) 135/60    Intake/Output Summary (Last 24 hours) at 8/5/2021 0934  Last data filed at 8/5/2021 0611  Gross per 24 hour   Intake 9700 ml   Output 35092 ml   Net -1800 ml     Body mass index is 27.03 kg/m².      08/04/21  1259   Weight: 95.5 kg (210 lb 8.6 oz)           Physical Exam:   Vitals and nursing note reviewed.   Constitutional:       Appearance: Chronically ill-appearing.   Pulmonary:      Effort: Pulmonary effort is normal.   Cardiovascular:      Bradycardia present. Regular rhythm.   Edema:     Peripheral edema absent.   Abdominal:      General: Abdomen is flat.   Neurological:      Mental Status: Alert and oriented to person, place and time.   Psychiatric:         Attention and Perception: Attention and perception normal.           Lab Review:     Results from last 7 days   Lab Units 08/05/21  0530 08/04/21  1304 08/04/21  1304 08/02/21  1618 08/02/21  1618   SODIUM mmol/L 138  --  143  --  142   POTASSIUM mmol/L 4.7  --  4.3  --  4.6   CHLORIDE mmol/L 99  --  101  --  101   CO2 mmol/L 25.2  --  26.8  --  24.9   BUN mg/dL 57*  --  62*  --  59*   CREATININE mg/dL 6.33*  --  6.04*  --  6.83*   GLUCOSE mg/dL 173*   < > 160*   < > 99   CALCIUM mg/dL 8.3*  --  8.7  --  8.5*    < > = values in this interval not displayed.       Results from last 7 days   Lab Units 08/04/21  2254   TROPONIN T ng/mL 0.269*     Results from last 7 days   Lab Units 08/05/21  0530   WBC 10*3/mm3 5.56   HEMOGLOBIN g/dL 7.7*   HEMATOCRIT % 24.4*   PLATELETS 10*3/mm3 115*                                   Imaging:    Imaging Results (Most Recent)     None          EKG:     I reviewed his telemetry.  Predominantly sinus bradycardia with rates in the 40's.  Occasionally  slightly slower, but by time patient was likely asleep.  I see no significant conduction issues.       Baseline:     I personally viewed and interpreted the patient's EKG/Telemetry tracings.    Assessment:   Assessment/Plan         Hematuria, gross  Sinus Bradycardia  1st Degree AV Block  ESRD  Elevated Troponin      Plan:     Asymptomatic sinus bradycardia with 1st AV Block.  No further treatment or evaluation needed at this time.     Elevated Troponin.  Secondary to reduced clearance with ESRD.  No further evaluation needed.     Could be discharged home from cardiology perspective.  Please call with questions.     Thank you for allowing me to participate in the care of Nikita Davis. Feel free to contact me directly with any further questions or concerns.    Alvin Shelton MD  08/05/21  09:34 EDT

## 2021-08-05 NOTE — DISCHARGE INSTRUCTIONS
Transurethral Resection of the Prostate, Care After  This sheet gives you information about how to care for yourself after your procedure. Your health care provider may also give you more specific instructions. If you have problems or questions, contact your health care provider.  What can I expect after the procedure?  After the procedure, it is common to have:  · Mild pain in your lower abdomen.  · Soreness or mild discomfort in your penis from having the catheter inserted during the procedure.  · A feeling of urgency when you need to urinate.  · A small amount of blood in your urine. You may notice some small blood clots in your urine. These are normal.  Follow these instructions at home:  Medicines  · Take over-the-counter and prescription medicines only as told by your health care provider.  · If you were prescribed an antibiotic medicine, take it as told by your health care provider. Do not stop taking the antibiotic even if you start to feel better.  · Ask your health care provider if the medicine prescribed to you:  ? Requires you to avoid driving or using heavy machinery.  ? Can cause constipation. You may need to take actions to prevent or treat constipation, such as:  § Take over-the-counter or prescription medicines.  § Eat foods that are high in fiber, such as fresh fruits and vegetables, whole grains, and beans.  § Limit foods that are high in fat and processed sugars, such as fried or sweet foods.  · Do not drive for 24 hours if you were given a sedative during your procedure.  Activity    · Return to your normal activities as told by your health care provider. Ask your health care provider what activities are safe for you.  · Do not lift anything that is heavier than 10 lb (4.5 kg), or the limit that you are told, for 3 weeks after the procedure or until your health care provider says that it is safe.  · Avoid intense physical activity for as long as told by your health care provider.  · Avoid  sitting for a long time without moving. Get up and move around one or more times every few hours. This helps to prevent blood clots. You may increase your physical activity gradually as you start to feel better.  Lifestyle  · Do not drink alcohol for as long as told by your health care provider. This is especially important if you are taking prescription pain medicines.  · Do not engage in sexual activity until your health care provider says that you can do this.  General instructions    · Do not take baths, swim, or use a hot tub until your health care provider approves.  · Drink enough fluid to keep your urine pale yellow.  · Urinate as soon as you feel the need to. Do not try to hold your urine for long periods of time.  · If your health care provider approves, you may take a stool softener for 2-3 weeks to prevent you from straining to have a bowel movement.  · Wear compression stockings as told by your health care provider. These stockings help to prevent blood clots and reduce swelling in your legs.  · Keep all follow-up visits as told by your health care provider. This is important.  Contact a health care provider if you have:  · Difficulty urinating.  · A fever.  · Pain that gets worse or does not improve with medicine.  · Blood in your urine that does not go away after 1 week of resting and drinking more fluids.  · Swelling in your penis or testicles.  Get help right away if:  · You are unable to urinate.  · You are having more blood clots in your urine instead of fewer.  · You have:  ? Large blood clots.  ? A lot of blood in your urine.  ? Pain in your back or lower abdomen.  ? Pain or swelling in your legs.  ? Chills and you are shaking.  ? Difficulty breathing or shortness of breath.  Summary  · After the procedure, it is common to have a small amount of blood in your urine.  · Avoid heavy lifting and intense physical activity for as long as told by your health care provider.  · Urinate as soon as you  feel the need to. Do not try to hold your urine for long periods of time.  · Keep all follow-up visits as told by your health care provider. This is important.  This information is not intended to replace advice given to you by your health care provider. Make sure you discuss any questions you have with your health care provider.  Document Revised: 04/08/2020 Document Reviewed: 09/18/2019  Elsevier Patient Education © 2021 [employer]

## 2021-08-05 NOTE — NURSING NOTE
S/w Dr Thomas Re: low HR in the low 30's. Pt no s/s of distress. BP is stable. Ordered to transfer to Tele unit, cardio consult and trop

## 2021-08-05 NOTE — PROGRESS NOTES
AFVSS  POD#1  Urine clear.  No pain.    A/p - hematuria resolved.  S/p TURP.  D/c grider catheter.    D/c to home.  Pt to ISC at home.

## 2021-08-09 LAB
LAB AP CASE REPORT: NORMAL
PATH REPORT.FINAL DX SPEC: NORMAL
PATH REPORT.GROSS SPEC: NORMAL

## 2021-09-01 ENCOUNTER — APPOINTMENT (OUTPATIENT)
Dept: SLEEP MEDICINE | Facility: HOSPITAL | Age: 86
End: 2021-09-01

## 2021-09-10 ENCOUNTER — APPOINTMENT (OUTPATIENT)
Dept: WOUND CARE | Facility: HOSPITAL | Age: 86
End: 2021-09-10

## 2023-06-27 NOTE — CONSULTS
INITIAL CONSULT NOTE      Patient Name: Nikita Davis  : 1931  MRN: [id number]  Primary Care Physician: Lio Beach MD  Date of admission: 2021    Patient Care Team:  Lio Beach MD as PCP - General (Internal Medicine)  Mesfin Thomas MD as Consulting Physician (Cardiology)  Fran De Guzman MD as Consulting Physician (Nephrology)  Wu Ruiz MD as Consulting Physician (Cardiology)        Reason for Consult:       ESRD, on PD.     History of Present Illness:   Chief Complaint: hematuria, admit for cystoscopy, TURP    HISTORY:  Nikita Davis is a 89 y.o. male with past medical history of ESRD on CCPD at home 7 days a week, hypothyroidism, ACD, afib, hematuria, hypertension, mild MVR/TVR, metastatic carcinoma of the prostate, who presented  for cystoscopy with TURP. Patient now POD 1, CBI in place. Patient reports daily CCPD at home, last exchange 8/3. Patient denies any respiratory complaints, denies any present pain. Labs from today reveal: sodium 138, potassium 4.7, chloride 99, CO2 25, BUN 57, creatinine 6.33, WBC 5.56, hemoglobin 7.7, platelets 115.  No recent CXR for review. Prior BURT from 2021 showed: Calculated left ventricular EF = 60.2% Estimated left ventricular EF = 60% Estimated left ventricular EF was in agreement with the calculated left ventricular EF. Left ventricular systolic function is normal. Normal left ventricular wall thickness noted. The left ventricular cavity is borderline dilated. All left ventricular wall segments contract normally. Left ventricular diastolic function was indeterminate. Left atrial volume is severely increased. The right atrial cavity is severely dilated.Severe mitral annular calcification is present. There is moderate calcification of the mitral valve posterior leaflet(s). Mild mitral valve regurgitation is present. No significant mitral valve stenosis is present. Mild tricuspid valve regurgitation is present. Estimated right  Chief complaint:   Chief Complaint   Patient presents with   • Office Visit   • Menstrual Problem     Had pelvic US on 6/15/23, fibroid uterus. Currently anemic, low iron studies and hgb 10.2. Does not take iron anymore. Pt states shes always had heavy periods but the last few months its gotten worse. Goes through a super plus tampon and pad in less than an hour.  Has regular cycles except recently for a month and a half did not have a cycle.    • New Patient     Last pap 6/12/23, neg/neg   • Referral     Arleen       Vitals:  Visit Vitals  /88   LMP 06/05/2023 (Exact Date)       HISTORY OF PRESENT ILLNESS     HPI     50 year old female here for heavy menstrual bleeding.  Has a long history of heavy bleeding but in the past few months has become very heavy.   She can soak through a super pad and tampon in less than one hour at times. Denies intermenstrual bleeding.              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     Priority: Low   • Bilateral carpal tunnel syndrome 07/25/2018     Priority: Low   • Esophagitis      Priority: Low   • Depression with anxiety 09/05/2017     Priority: Low   • CRPS (complex regional pain syndrome type I) 02/04/2016     Priority: Low   • Benign essential HTN 05/29/2015     Priority: Low   • Tubular adenoma      Priority: Low     cscopy 9/2014     • Hyperhomocysteinemia (CMD)      Priority: Low       PAST MEDICAL, FAMILY AND SOCIAL HISTORY     Medications:  Current Outpatient Medications   Medication Sig Dispense Refill   • escitalopram (Lexapro) 10 MG tablet Take 1 tablet by mouth daily. 30 tablet 11   • amLODIPine (NORVASC) 5 MG tablet TAKE 1 TABLET BY MOUTH DAILY 90 tablet 0   • cetirizine (ZyrTEC Allergy) 10 23843 [id number]; Surgeon: Ari Garsia MD; Location: [facility]; Service:  ventricular systolic pressure from tricuspid regurgitation is moderately elevated (45-55 mmHg). Calculated right ventricular systolic pressure from tricuspid regurgitation is 52.4 mmHg.    We are consulted for ESRD, management of PD.     Review of systems:    Constitutional: No fever, no chills, no lethargy, no weakness.  HEENT:  No headache, otalgia, itchy eyes, nasal discharge or sore throat.  Cardiac:  No chest pain, dyspnea, orthopnea or PND.  Chest:              No cough, phlegm or wheezing.  Abdomen:  No abdominal pain, nausea or vomiting.  Neuro:  No focal weakness, abnormal movements orseizure like activity.  :   No hematuria, no pyuria, no dysuria, no flank pain.  ROS was otherwise negative except as mentioned in the [place].       Personal History:     Past Medical History:   Past Medical History:   Diagnosis Date   • Anemia    • Arthritis    • Asthma    • Atrial fibrillation (CMS/HCC)    • Bruises easily    • Bruises easily    • CHF (congestive heart failure) (CMS/HCC)    • Diabetes mellitus (CMS/HCC)    • Disease of thyroid gland    • ESRD (end stage renal disease) (CMS/HCC)    • Heart murmur    • Hematuria    • History of transfusion 05/13/2021    no reaction   • [place] ([place] hearing[place]    • Hyperlipidemia    • Hypertension    • Limited jaw range of motion     RIGHT ELBOW   • Peritoneal dialysis status (CMS/HCC)    • Renal disorder        Surgical History:      Past Surgical History:   Procedure Laterality Date   • ADENOIDECTOMY     • APPENDECTOMY     • COLONOSCOPY     • CYSTOSCOPY N/A 6/1/2021    Procedure: CYSTOSCOPY WITH FULGURATION;  Surgeon: Anton Segal MD;  Location: [facility];  Service: Urology;  Laterality: N/A;   • EYE SURGERY     • HIP BIPOLAR REPLACEMENT Left    • MASTOIDECTOMY     • REPLACEMENT TOTAL KNEE Bilateral    • TONSILLECTOMY     • TOTAL KNEE ARTHROPLASTY REVISION Bilateral        Family History: family history is not on file. Otherwise pertinent FHx was reviewed and  Orthopedics; Laterality: Right;   • Carpal tunnel release Left 2019   •  section, classic  2006    related to placental abruption   • D and c  2007   • Egd  10/15/2018    [facility]   • Endometrial biopsy  2019   • Esophagogastroduodenoscopy  10/15/2018    repeat in 3 years -has had many prior to this one    • Foot arthrodesis Right 2017    LATERAL DISPLACEMENT CALCANEUS OSTEOTOMY 88866; Surgeon: Ari Garsia MD; Location: [facility]; Service: Orthopedics; Laterality: Right;   • Other blood      had thumb surgery on left thumb for blood clot    • Parathyroid gland surgery     • Removal gallbladder  2016   • Spinal cord stimulator implant  2019       Family History:  Family History   Problem Relation Age of Onset   • High blood pressure Mother    • Diabetes Mother         boarder line   • Thyroid Mother    • Hypertension Mother    • Early death Father         age 46   • Stroke Father    • High blood pressure Father    • Myocardial Infarction Father    • Hypertension Father    • High blood pressure Sister    • Hypertension Sister    • Hypertension Brother    • Cancer, Breast Maternal Grandmother    • Cancer Maternal Grandmother         breast cancer    • Cancer, Breast Maternal Aunt        Social History:  Social History     Tobacco Use   • Smoking status: Former     Current packs/day: 0.00     Average packs/day: 1 pack/day for 15.0 years (15.0 pk-yrs)     Types: Cigarettes     Start date: 9/3/1991     Quit date: 9/3/2006     Years since quittin.8   • Smokeless tobacco: Never   Substance Use Topics   • Alcohol use: Yes     Alcohol/week: 12.0 standard drinks of alcohol     Types: 6 Cans of beer, 6 Standard drinks or equivalent per week     Comment: week-soc       REVIEW OF SYSTEMS     Review of Systems    PHYSICAL EXAM     Physical Exam    ASSESSMENT/PLAN     1. HMB   -US reviewed - Uterus [id number], 2 fibroids noted.   -Will need endometrial biopsy  -Pap/HPV neg/neg -  unremarkable.     Social History:  reports that he quit smoking about 49 years ago. His smoking use included cigarettes. He has a 40.00 pack-year smoking history. He has never used smokeless tobacco. He reports current alcohol use. He reports that he does not use drugs.    Medications:  Prior to Admission medications    Medication Sig Start Date End Date Taking? Authorizing Provider   atorvastatin (LIPITOR) 40 MG tablet Take 40 mg by mouth Every Night. 3/15/21  Yes Joaquina Estrada MD   Budesonide-Formoterol Fumarate (SYMBICORT IN) Inhale 2 puffs Every Morning.   Yes Joaquina Estrada MD   Calcium Carbonate (CALCIUM 600 PO) Take 1 tablet by mouth Every Night.   Yes Joaquina Estrada MD   furosemide (LASIX) 80 MG tablet Take 80 mg by mouth 3 (Three) Times a Day. 10/29/19  Yes Emergency, Nurse Epic, RN   INSULIN GLARGINE SC Inject 15 Units under the skin into the appropriate area as directed Every Morning. Lantus   Yes Joaquina Estrada MD   Insulin Lispro (HUMALOG KWIKPEN SC) Inject 6-16 Units under the skin into the appropriate area as directed 3 (Three) Times a Day. Pt on sliding scale 100-150 6 u, 150-200 8u, 200-250 10u, 250-300 12u, 300-350 14 u, 350-400 16u   Yes Joaquina Estrada MD   levothyroxine (SYNTHROID, LEVOTHROID) 175 MCG tablet Take  by mouth Daily.   Yes Emergency, Nurse Lizz RN   loratadine (CLARITIN) 10 MG tablet Take 10 mg by mouth Daily. 1/19/21  Yes Joaquina Estrada MD   multivitamin (Multi-Vitamin Daily) tablet tablet Take 1 tablet by mouth Daily.   Yes Joaquina Estrada MD   traZODone (DESYREL) 50 MG tablet Take  mg by mouth Every Night. 5/12/21  Yes Joaquina Estrada MD   VITAMIN D PO Take 1 tablet by mouth Daily.   Yes Joaquina Estrada MD   albuterol sulfate  (90 Base) MCG/ACT inhaler Inhale 2 puffs Daily As Needed.    Emergency, Nurse Lizz RN   Blood Glucose Monitoring Suppl (Accu-Chek Erika Plus) w/Device kit Test daily before all  6/12/23  -We discussed treatment options of HMB including medical and surgical options.  We discussed hormonal contraception including OCP, Depot Provera, Nexplanon, and IUD insertion.  We discussed surgical options such as endometrial ablation and hysterectomy.  We discussed the risks and benefits of both medical and surgical options.     2. Iron deficiency anemia  -2/2 #1  -Recommend PO iron.  If cannot tolerate or absorb, plan IV iron    3. Patient given information on therapies and will have her return for EMB and further discussion.    meals/snacks and once before bedtime. 4/16/21   Joaquina Estrada MD   ergocalciferol (ERGOCALCIFEROL) 1.25 MG (49073 UT) capsule Take 50,000 Units by mouth 1 (One) Time Per Week.    Joaquina Estrada MD   glucose blood (Accu-Chek Erika Plus) test strip Test daily before all meals/snacks and once before bedtime. 4/16/21 4/16/22  Joaquina Estrada MD   Lancets (accu-chek multiclix) lancets Test daily before all meals/snacks and once before bedtime. 4/16/21 4/16/22  Joaquina Estrada MD     Scheduled Meds:atorvastatin, 40 mg, Oral, Nightly  budesonide-formoterol, 2 puff, Inhalation, QAM  cetirizine, 5 mg, Oral, Daily  furosemide, 80 mg, Oral, TID  insulin glargine, 15 Units, Subcutaneous, QAM  insulin lispro, 0-9 Units, Subcutaneous, TID With Meals  levothyroxine, 175 mcg, Oral, QAM      Continuous Infusions:sodium chloride, 75 mL/hr, Last Rate: 75 mL/hr (08/05/21 0629)  sodium chloride, 9 mL/hr, Last Rate: 9 mL/hr (08/04/21 1325)      PRN Meds:•  acetaminophen **OR** acetaminophen  •  albuterol  •  dextrose  •  dextrose  •  docusate sodium  •  glucagon (human recombinant)  •  HYDROcodone-acetaminophen  Allergies:    Allergies   Allergen Reactions   • Tamsulosin Rash   • Ferric Citrate Itching and Rash     Phosphorous binder       Objective   Exam:     Vital Signs  Temp:  [96.6 °F (35.9 °C)-97.8 °F (36.6 °C)] 97.3 °F (36.3 °C)  Heart Rate:  [31-77] 58  Resp:  [16-20] 18  BP: (106-168)/(54-70) 135/60  SpO2:  [95 %-100 %] 97 %  on   ;   Device (Oxygen Therapy): room air  Body mass index is 27.03 kg/m².  EXAM  General:  Chronically ill appearing male     Head:      Normocephalic and atraumatic.    Eyes:      PERRL/EOM intact    Neck:      No masses, thyromegaly,  trachea central   Lungs:    Clear, unlaboted   Heart:      Regular rate and rhythm, no murmur no gallop  Abd:        Soft, nontender, not distended, bowel sounds positive  Msk:        No deformity or scoliosis noted of thoracic or lumbar spine.     Pulses:   Pulses normal in all 4 extremities.    :       CBI in place  Extremities:        No cyanosis or clubbing--no edema.    Neuro:    No focal deficits.   alert oriented x3  Skin:       Intact without lesions or rashes.    Psych:    Alert and cooperative; normal mood and affect; normal attention span       Results Review:  I have personally reviewed most recent Data :  [facility](creatinine:10)  CBC    Results from last 7 days   Lab Units 08/05/21  0530 08/02/21  1618   WBC 10*3/mm3 5.56 5.41   HEMOGLOBIN g/dL 7.7* 8.5*   PLATELETS 10*3/mm3 115* 172     CMP   Results from last 7 days   Lab Units 08/05/21  0530 08/04/21  1304 08/02/21  1618   SODIUM mmol/L 138 143 142   POTASSIUM mmol/L 4.7 4.3 4.6   CHLORIDE mmol/L 99 101 101   CO2 mmol/L 25.2 26.8 24.9   BUN mg/dL 57* 62* 59*   CREATININE mg/dL 6.33* 6.04* 6.83*   GLUCOSE mg/dL 173* 160* 99   ALBUMIN g/dL 3.30*  --   --    BILIRUBIN mg/dL <0.2  --   --    ALK PHOS U/L 73  --   --    AST (SGOT) U/L 17  --   --    ALT (SGPT) U/L 12  --   --      ABG      No radiology results for the last 7 days    Results for orders placed during the hospital encounter of 06/29/21    Adult Transthoracic Echo Complete W/ Cont if Necessary Per Protocol    Interpretation Summary  · Calculated left ventricular EF = 60.2% Estimated left ventricular EF = 60% Estimated left ventricular EF was in agreement with the calculated left ventricular EF. Left ventricular systolic function is normal. Normal left ventricular wall thickness noted. The left ventricular cavity is borderline dilated. All left ventricular wall segments contract normally. Left ventricular diastolic function was indeterminate.  · Left atrial volume is severely increased. The right atrial cavity is severely dilated.  · Severe mitral annular calcification is present. There is moderate calcification of the mitral valve posterior leaflet(s). Mild mitral valve regurgitation is present. No significant mitral valve  stenosis is present.  · Mild tricuspid valve regurgitation is present. Estimated right ventricular systolic pressure from tricuspid regurgitation is moderately elevated (45-55 mmHg). Calculated right ventricular systolic pressure from tricuspid regurgitation is 52.4 mmHg.        Assessment/Plan   Assessment and Plan:         Hematuria, gross    ASSESSMENT:  • ESRD, on PD. Follows with our associate, Dr Fran De Guzman, patient of [facility] PD/home therapy, daily treatment with last exchange Tuesday. Will order low volume PD, 1 liter 2.5%, 3 exchanges  During the day with one long dwell 10-12 hours overnight.  • Gross hematuria, metastatic CAP, now POD 1 cystoscopy, TURP with CBI now in place.  · Mild TVR, MVR. Prior BURT from 6/2021 showed: Calculated left ventricular EF = 60.2% Estimated left ventricular EF = 60% Estimated left ventricular EF was in agreement with the calculated left ventricular EF. Left ventricular systolic function is normal. Normal left ventricular wall thickness noted. The left ventricular cavity is borderline dilated. All left ventricular wall segments contract normally. Left ventricular diastolic function was indeterminate. Left atrial volume is severely increased. The right atrial cavity is severely dilated.Severe mitral annular calcification is present. There is moderate calcification of the mitral valve posterior leaflet(s). Mild mitral valve regurgitation is present. No significant mitral valve stenosis is present. Mild tricuspid valve regurgitation is present. Estimated right ventricular systolic pressure from tricuspid regurgitation is moderately elevated (45-55 mmHg). Calculated right ventricular systolic pressure from tricuspid regurgitation is 52.4 mmHg.    • History of hypertension  • History of atrial fibrillation  • History of hypothyroidism      Plan:     · ESRD, on PD.Last exchange Tuesday. Will order low volume PD, 1 liter 2.5% with 3 exchanges in the day time with one long 10-12 hour  dwell overnight if pt is not discharged today, if he goes home then he can perform his CCPD at home  · I spoke to Dr Segal and he sees no contra indication to PD  · If needed today, ordered low volume pd  · Gross hematuria, metastatic CAP, now POD 1 cystoscopy, TURP with CBI now in place.  · Hemodynamically stable at present  · Electrolytes, acid base acceptable.   · Previously treated with Levaquin, Cefazolin.   · Hemoglobin 7.7, on CINDA as outpatient, last dose Mircera 100mcg received 7/21, recommend transfusion if hemoglobin drops below 7.0. Previously receiving iron 100mg IV q week, will check iron studies.    · POC discussed with patient  · Discussed with RN, JAMES PD RN  · POC discussed with urologist, Dr Segal.  · We will continue to closely monitor volume, electrolytes, acid base.  · We will continue to follow.    Thanks for this consultation.    Note started  by JULIANNA Jaffe,   [facility] kidney consultant  [phone]        Patient was seen earlier by  JULIANNA. I personally have examined the patient and interviewed the patient. I have reviewed the history, data, problems, assessment and plan with the nurse practitioner during rounds and I concur with the history, exam, assessment and plan as described in the progress note with comments additions, revisions as noted.           Ladarius De Guzman MD  8/5/2021  [facility]

## 2025-03-01 NOTE — CASE MANAGEMENT/SOCIAL WORK
Case Management Discharge Note      Final Note: home via private auto    Provided Post Acute Provider List?: Refused  Refused Provider List Comment: Declined home health services  Provided Post Acute Provider Quality & Resource List?: Refused    Selected Continued Care - Discharged on 8/5/2021 Admission date: 8/4/2021 - Discharge disposition: Home or Self Care    Destination    No services have been selected for the patient.              Durable Medical Equipment    No services have been selected for the patient.              Dialysis/Infusion    No services have been selected for the patient.              Home Medical Care    No services have been selected for the patient.              Therapy    No services have been selected for the patient.              Community Resources    No services have been selected for the patient.              Community & DME    No services have been selected for the patient.                  Transportation Services  Private: Car    Final Discharge Disposition Code: 01 - home or self-care   herbie

## (undated) DEVICE — TIDISHIELD UROLOGY DRAIN BAGS FROSTY VINYL STERILE FITS SIEMENS UROSKOP ACCESS 20 PER CASE: Brand: TIDISHIELD

## (undated) DEVICE — LOU TUR: Brand: MEDLINE INDUSTRIES, INC.

## (undated) DEVICE — LOU CYSTO: Brand: MEDLINE INDUSTRIES, INC.

## (undated) DEVICE — DOVER HYDROGEL COATED LATEX FOLEY CATHETER, 30 ML, 3-WAY 24 FR/CH (8.0 MM): Brand: DOVER

## (undated) DEVICE — MAT FLR ABSORBENT LG 4FT 10 2.5FT

## (undated) DEVICE — HF-RESECTION ELECTRODE PLASMALOOP LOOP, MEDIUM, 24 FR., 12°-30°, ESG TURIS: Brand: OLYMPUS

## (undated) DEVICE — GLV SURG BIOGEL LTX PF 8

## (undated) DEVICE — BAG,DRAINAGE,4L,A/R TOWER,LL,SLIDE TAP: Brand: MEDLINE